# Patient Record
Sex: MALE | Race: WHITE | NOT HISPANIC OR LATINO | Employment: FULL TIME | URBAN - METROPOLITAN AREA
[De-identification: names, ages, dates, MRNs, and addresses within clinical notes are randomized per-mention and may not be internally consistent; named-entity substitution may affect disease eponyms.]

---

## 2022-12-11 ENCOUNTER — HOSPITAL ENCOUNTER (EMERGENCY)
Facility: CLINIC | Age: 27
Discharge: HOME | End: 2022-12-12
Attending: EMERGENCY MEDICINE | Admitting: EMERGENCY MEDICINE
Payer: COMMERCIAL

## 2022-12-11 ENCOUNTER — APPOINTMENT (OUTPATIENT)
Dept: CT IMAGING | Facility: CLINIC | Age: 27
End: 2022-12-11
Attending: EMERGENCY MEDICINE
Payer: COMMERCIAL

## 2022-12-11 DIAGNOSIS — K21.00 GASTROESOPHAGEAL REFLUX DISEASE WITH ESOPHAGITIS WITHOUT HEMORRHAGE: ICD-10-CM

## 2022-12-11 DIAGNOSIS — R07.89 OTHER CHEST PAIN: ICD-10-CM

## 2022-12-11 LAB
ALBUMIN SERPL-MCNC: 3.7 G/DL (ref 3.5–5)
ALP SERPL-CCNC: 70 U/L (ref 45–120)
ALT SERPL W P-5'-P-CCNC: 21 U/L (ref 0–45)
ANION GAP SERPL CALCULATED.3IONS-SCNC: 6 MMOL/L (ref 5–18)
AST SERPL W P-5'-P-CCNC: 17 U/L (ref 0–40)
BASOPHILS # BLD AUTO: 0.1 10E3/UL (ref 0–0.2)
BASOPHILS NFR BLD AUTO: 1 %
BILIRUB SERPL-MCNC: 0.3 MG/DL (ref 0–1)
BUN SERPL-MCNC: 15 MG/DL (ref 8–22)
CALCIUM SERPL-MCNC: 9.6 MG/DL (ref 8.5–10.5)
CHLORIDE BLD-SCNC: 102 MMOL/L (ref 98–107)
CO2 SERPL-SCNC: 29 MMOL/L (ref 22–31)
CREAT SERPL-MCNC: 0.9 MG/DL (ref 0.7–1.3)
D DIMER PPP FEU-MCNC: <=0.27 UG/ML FEU (ref 0–0.5)
EOSINOPHIL # BLD AUTO: 0.4 10E3/UL (ref 0–0.7)
EOSINOPHIL NFR BLD AUTO: 4 %
ERYTHROCYTE [DISTWIDTH] IN BLOOD BY AUTOMATED COUNT: 13.7 % (ref 10–15)
GFR SERPL CREATININE-BSD FRML MDRD: >90 ML/MIN/1.73M2
GLUCOSE BLD-MCNC: 100 MG/DL (ref 70–125)
HCT VFR BLD AUTO: 40 % (ref 40–53)
HGB BLD-MCNC: 13.1 G/DL (ref 13.3–17.7)
HOLD SPECIMEN: NORMAL
IMM GRANULOCYTES # BLD: 0 10E3/UL
IMM GRANULOCYTES NFR BLD: 0 %
LIPASE SERPL-CCNC: 31 U/L (ref 0–52)
LYMPHOCYTES # BLD AUTO: 2.6 10E3/UL (ref 0.8–5.3)
LYMPHOCYTES NFR BLD AUTO: 33 %
MAGNESIUM SERPL-MCNC: 2.3 MG/DL (ref 1.8–2.6)
MCH RBC QN AUTO: 25.7 PG (ref 26.5–33)
MCHC RBC AUTO-ENTMCNC: 32.8 G/DL (ref 31.5–36.5)
MCV RBC AUTO: 79 FL (ref 78–100)
MONOCYTES # BLD AUTO: 0.8 10E3/UL (ref 0–1.3)
MONOCYTES NFR BLD AUTO: 10 %
NEUTROPHILS # BLD AUTO: 4 10E3/UL (ref 1.6–8.3)
NEUTROPHILS NFR BLD AUTO: 52 %
NRBC # BLD AUTO: 0 10E3/UL
NRBC BLD AUTO-RTO: 0 /100
PLATELET # BLD AUTO: 250 10E3/UL (ref 150–450)
POTASSIUM BLD-SCNC: 3.9 MMOL/L (ref 3.5–5)
PROT SERPL-MCNC: 7.8 G/DL (ref 6–8)
RBC # BLD AUTO: 5.09 10E6/UL (ref 4.4–5.9)
SODIUM SERPL-SCNC: 137 MMOL/L (ref 136–145)
TROPONIN I SERPL-MCNC: 0.01 NG/ML (ref 0–0.29)
WBC # BLD AUTO: 7.9 10E3/UL (ref 4–11)

## 2022-12-11 PROCEDURE — 250N000011 HC RX IP 250 OP 636: Performed by: EMERGENCY MEDICINE

## 2022-12-11 PROCEDURE — 96375 TX/PRO/DX INJ NEW DRUG ADDON: CPT | Mod: 59

## 2022-12-11 PROCEDURE — 85025 COMPLETE CBC W/AUTO DIFF WBC: CPT | Performed by: EMERGENCY MEDICINE

## 2022-12-11 PROCEDURE — 83735 ASSAY OF MAGNESIUM: CPT | Performed by: EMERGENCY MEDICINE

## 2022-12-11 PROCEDURE — 83690 ASSAY OF LIPASE: CPT | Performed by: EMERGENCY MEDICINE

## 2022-12-11 PROCEDURE — 36415 COLL VENOUS BLD VENIPUNCTURE: CPT | Performed by: EMERGENCY MEDICINE

## 2022-12-11 PROCEDURE — 85379 FIBRIN DEGRADATION QUANT: CPT | Performed by: EMERGENCY MEDICINE

## 2022-12-11 PROCEDURE — 80053 COMPREHEN METABOLIC PANEL: CPT | Performed by: EMERGENCY MEDICINE

## 2022-12-11 PROCEDURE — 84484 ASSAY OF TROPONIN QUANT: CPT | Performed by: EMERGENCY MEDICINE

## 2022-12-11 PROCEDURE — 93005 ELECTROCARDIOGRAM TRACING: CPT | Performed by: EMERGENCY MEDICINE

## 2022-12-11 PROCEDURE — 96374 THER/PROPH/DIAG INJ IV PUSH: CPT | Mod: 59

## 2022-12-11 PROCEDURE — 258N000003 HC RX IP 258 OP 636: Performed by: EMERGENCY MEDICINE

## 2022-12-11 PROCEDURE — 96361 HYDRATE IV INFUSION ADD-ON: CPT | Mod: 59

## 2022-12-11 PROCEDURE — 74174 CTA ABD&PLVS W/CONTRAST: CPT

## 2022-12-11 PROCEDURE — 99285 EMERGENCY DEPT VISIT HI MDM: CPT | Mod: 25

## 2022-12-11 PROCEDURE — 71275 CT ANGIOGRAPHY CHEST: CPT

## 2022-12-11 RX ORDER — IOPAMIDOL 755 MG/ML
90 INJECTION, SOLUTION INTRAVASCULAR ONCE
Status: COMPLETED | OUTPATIENT
Start: 2022-12-11 | End: 2022-12-11

## 2022-12-11 RX ORDER — MORPHINE SULFATE 4 MG/ML
4 INJECTION, SOLUTION INTRAMUSCULAR; INTRAVENOUS ONCE
Status: COMPLETED | OUTPATIENT
Start: 2022-12-11 | End: 2022-12-11

## 2022-12-11 RX ORDER — LORAZEPAM 2 MG/ML
1 INJECTION INTRAMUSCULAR ONCE
Status: COMPLETED | OUTPATIENT
Start: 2022-12-11 | End: 2022-12-11

## 2022-12-11 RX ORDER — FAMOTIDINE 40 MG/1
40 TABLET, FILM COATED ORAL DAILY
Qty: 14 TABLET | Refills: 0 | Status: SHIPPED | OUTPATIENT
Start: 2022-12-11 | End: 2022-12-25

## 2022-12-11 RX ADMIN — IOPAMIDOL 90 ML: 755 INJECTION, SOLUTION INTRAVENOUS at 23:01

## 2022-12-11 RX ADMIN — SODIUM CHLORIDE 1000 ML: 9 INJECTION, SOLUTION INTRAVENOUS at 21:57

## 2022-12-11 RX ADMIN — LORAZEPAM 1 MG: 2 INJECTION INTRAMUSCULAR; INTRAVENOUS at 22:15

## 2022-12-11 RX ADMIN — FAMOTIDINE 20 MG: 10 INJECTION, SOLUTION INTRAVENOUS at 22:51

## 2022-12-11 RX ADMIN — MORPHINE SULFATE 4 MG: 4 INJECTION, SOLUTION INTRAMUSCULAR; INTRAVENOUS at 22:02

## 2022-12-11 ASSESSMENT — ENCOUNTER SYMPTOMS
COUGH: 0
SHORTNESS OF BREATH: 0
BACK PAIN: 1
FEVER: 0
BLOOD IN STOOL: 0
CHEST TIGHTNESS: 1
CHILLS: 0
ABDOMINAL PAIN: 1

## 2022-12-11 ASSESSMENT — ACTIVITIES OF DAILY LIVING (ADL): ADLS_ACUITY_SCORE: 35

## 2022-12-12 VITALS
TEMPERATURE: 99.1 F | OXYGEN SATURATION: 100 % | DIASTOLIC BLOOD PRESSURE: 60 MMHG | SYSTOLIC BLOOD PRESSURE: 123 MMHG | RESPIRATION RATE: 16 BRPM | HEART RATE: 79 BPM | WEIGHT: 160 LBS

## 2022-12-12 LAB
ATRIAL RATE - MUSE: 70 BPM
ATRIAL RATE - MUSE: 80 BPM
DIASTOLIC BLOOD PRESSURE - MUSE: NORMAL MMHG
DIASTOLIC BLOOD PRESSURE - MUSE: NORMAL MMHG
INTERPRETATION ECG - MUSE: NORMAL
INTERPRETATION ECG - MUSE: NORMAL
P AXIS - MUSE: 72 DEGREES
P AXIS - MUSE: 79 DEGREES
PR INTERVAL - MUSE: 146 MS
PR INTERVAL - MUSE: 146 MS
QRS DURATION - MUSE: 102 MS
QRS DURATION - MUSE: 98 MS
QT - MUSE: 368 MS
QT - MUSE: 396 MS
QTC - MUSE: 424 MS
QTC - MUSE: 427 MS
R AXIS - MUSE: 90 DEGREES
R AXIS - MUSE: 95 DEGREES
SYSTOLIC BLOOD PRESSURE - MUSE: NORMAL MMHG
SYSTOLIC BLOOD PRESSURE - MUSE: NORMAL MMHG
T AXIS - MUSE: 69 DEGREES
T AXIS - MUSE: 79 DEGREES
TROPONIN I SERPL-MCNC: 0.01 NG/ML (ref 0–0.29)
VENTRICULAR RATE- MUSE: 70 BPM
VENTRICULAR RATE- MUSE: 80 BPM

## 2022-12-12 PROCEDURE — 93005 ELECTROCARDIOGRAM TRACING: CPT | Performed by: EMERGENCY MEDICINE

## 2022-12-12 PROCEDURE — 84484 ASSAY OF TROPONIN QUANT: CPT | Performed by: EMERGENCY MEDICINE

## 2022-12-12 PROCEDURE — 84484 ASSAY OF TROPONIN QUANT: CPT | Mod: 91 | Performed by: EMERGENCY MEDICINE

## 2022-12-12 PROCEDURE — 36415 COLL VENOUS BLD VENIPUNCTURE: CPT | Performed by: EMERGENCY MEDICINE

## 2022-12-12 ASSESSMENT — ACTIVITIES OF DAILY LIVING (ADL): ADLS_ACUITY_SCORE: 35

## 2022-12-12 NOTE — ED NOTES
Pt appears very physically uncomfortable, grimacing and clenching his fists; gave 4mg morphine but pt reports minimal relief. He is breathing hard thru clenched teeth; minimally redirectable. Dr. Medrano informed, he will come assess pt. ABCs intact and VSS, including SaO2 and HR.

## 2022-12-12 NOTE — ED NOTES
EMERGENCY DEPARTMENT SIGN OUT NOTE        ED COURSE AND MEDICAL DECISION MAKING  Patient was signed out to me by Dr Nazario Landis at 12:10 AM      In brief, Rajinder Francisco is a 27 year old male who initially presented with chest pain.    Patient reports midsternal chest pain and tightness radiating to the shoulder blades rated as an 8/10 in severity of pain that started 30 minutes prior to arrival. He describes the pain as a heavy weight that is close to the rib cage and has been shifting slightly lower towards the abdomen. Patient notes that he has difficulty with deep breaths secondary to pain.    At time of sign out, disposition was pending repeat troponin and discharge.  Repeat troponin and EKG do not show any evidence of acute pathology including negative troponin and no ischemia or arrhythmia on the EKG.  I did discuss results with the patient, he feels comfortable discharge to home as originally planned by the previous provider.  Return precautions discussed.    FINAL IMPRESSION    1. Gastroesophageal reflux disease with esophagitis without hemorrhage    2. Other chest pain        ED MEDS  Medications   0.9% sodium chloride BOLUS (0 mLs Intravenous Stopped 12/11/22 2243)   morphine (PF) injection 4 mg (4 mg Intravenous Given 12/11/22 2202)   LORazepam (ATIVAN) injection 1 mg (1 mg Intravenous Given 12/11/22 2215)   famotidine (PEPCID) injection 20 mg (20 mg Intravenous Given 12/11/22 2251)   iopamidol (ISOVUE-370) solution 90 mL (90 mLs Intravenous Given 12/11/22 2301)       LAB  Labs Ordered and Resulted from Time of ED Arrival to Time of ED Departure   CBC WITH PLATELETS AND DIFFERENTIAL - Abnormal       Result Value    WBC Count 7.9      RBC Count 5.09      Hemoglobin 13.1 (*)     Hematocrit 40.0      MCV 79      MCH 25.7 (*)     MCHC 32.8      RDW 13.7      Platelet Count 250      % Neutrophils 52      % Lymphocytes 33      % Monocytes 10      % Eosinophils 4      % Basophils 1      % Immature  Granulocytes 0      NRBCs per 100 WBC 0      Absolute Neutrophils 4.0      Absolute Lymphocytes 2.6      Absolute Monocytes 0.8      Absolute Eosinophils 0.4      Absolute Basophils 0.1      Absolute Immature Granulocytes 0.0      Absolute NRBCs 0.0     COMPREHENSIVE METABOLIC PANEL - Normal    Sodium 137      Potassium 3.9      Chloride 102      Carbon Dioxide (CO2) 29      Anion Gap 6      Urea Nitrogen 15      Creatinine 0.90      Calcium 9.6      Glucose 100      Alkaline Phosphatase 70      AST 17      ALT 21      Protein Total 7.8      Albumin 3.7      Bilirubin Total 0.3      GFR Estimate >90     LIPASE - Normal    Lipase 31     TROPONIN I - Normal    Troponin I 0.01     MAGNESIUM - Normal    Magnesium 2.3     D DIMER QUANTITATIVE - Normal    D-Dimer Quantitative <=0.27     TROPONIN I       EKG #2  EKG    Rate: 70bpm  Rhythm: Normal Sinus Rhythm  Axis: right  Interval: Normal  Conduction: Normal  QRS: Normal  ST: Normal  T-wave: Normal  QT: Not prolonged  Comparison EKG: no significant change compared to previous  Impression:  No acute ischemic change   I have independently reviewed and interpreted today's EKG, pending Cardiologist read      RADIOLOGY    CTA Chest Abdomen Pelvis w Contrast   Final Result   IMPRESSION:   1.  No evidence of aortic dissection or aneurysm.   2.  No acute process in the chest, abdomen, or pelvis.             DISCHARGE MEDS  New Prescriptions    FAMOTIDINE (PEPCID) 40 MG TABLET    Take 1 tablet (40 mg) by mouth daily for 14 days       I, Doug Thacker, am serving as a scribe to document services personally performed by Jamie Costa DO, based on my observations and the provider's statements to me.  I, Jamie Costa DO, attest that Doug Thacker is acting in a scribe capacity, has observed my performance of the services and has documented them in accordance with my direction.    Jamie Costa DO  [facility] EMERGENCY ROOM  [address]  [address]  [phone]     Jamie Costa,   12/12/22 0442

## 2022-12-12 NOTE — ED NOTES
Pt's pain visibly improved - physically relaxed and dozing on the stretcher, breathing is more even, jaw and hands are not clenched. Partner at bedside agrees that pt is having significantly less pain. Pt rating as 5/10 with movement. ABCs intact and VS remain stable.

## 2022-12-12 NOTE — DISCHARGE INSTRUCTIONS
Your cardiopulmonary work-up for the chest discomfort was negative.  Given your low risk cardiac status and your negative chest pain work-up this does not appear to be cardiac mediated.  Given your history of Crohn's disease would favor inflammation of your esophagus as the acute source of your symptoms.  Please follow-up this week with your primary care doctor and gastroenterology team for additional assessment take medication as prescribed.  Monitor your symptoms closely.  If escalation to your symptoms develop distal concern return to the emergency department repeat assessment.

## 2022-12-12 NOTE — ED PROVIDER NOTES
EMERGENCY DEPARTMENT ENCOUNTER      NAME: Rajinder Francisco  AGE: 27 year old male  YOB: 1995  MRN: [id number]  EVALUATION DATE & TIME: No admission date for patient encounter.    PCP: No primary care provider on file.    ED PROVIDER: Nazario Landis MD    Chief Complaint   Patient presents with     Chest Pain     FINAL IMPRESSION:  1. Gastroesophageal reflux disease with esophagitis without hemorrhage    2. Other chest pain      ED COURSE & MEDICAL DECISION MAKING:    Pertinent Labs & Imaging studies reviewed. (See chart for details)  27 year old male presents to the Emergency Department for evaluation of chest pain symptoms.  Patient reports onset of chest pain symptoms this evening.  He does have difficulty characterizing the exact nature of his symptoms.  He also reports feeling quite anxious.  Possible history of Crohn's disease managed with Remicade.  On examination which was initially done on triage based on patient's chief complaint the patient was appearing uncomfortable.  He was clutching his anterior chest.  He was appearing also very anxious.  Some mild diffuse abdominal discomfort to palpation nonfocal in nature.  Clinical examination otherwise unremarkable.  Initial ECG did not appear to be ischemic.  Laboratory testing demonstrating a negative troponin and negative D-dimer.  His symptoms were refractory to treatment here in the emergency department.  Could be related to a Crohn's flare although he does not have the other symptoms I would expect such as bloody stools or change to stool at this time.  Ultimately due to his persistent symptoms despite medication management I did recommend CTA imaging of the chest abdomen pelvis for additional assessment.  We will continue to medicate his symptoms awaiting additional imaging testing.  PERC negative heart score negative.    11:59 PM  CTA was negative.  Patient eventually did have significant improvement to his symptomatology and  currently at this point is asymptomatic.  Given the timing of his symptoms I did recommend to the patient that we perform a repeat troponin and electrocardiogram at 3 hours.  If this remains negative along with his negative CTA negative initial work-up his response to treatment here in the emergency department I think patient is appropriate for discharge home.  At this point nothing is suggesting a cardiac mediated process.  Low risk chest pain with no cardiac risk factors.  Would favor esophagitis as acute source for his symptoms with history of chronic GI issues.  We will plan to reassess after repeat testing if negative anticipate discharge home.  Patient signed out to oncoming physician at this point in care.  Patient and family updated and are in agreement with plan of care    9:37 PM I met with the patient for the initial interview and physical examination. Discussed plan for treatment and workup in the ED.           At the conclusion of the encounter I discussed the results of all of the tests and the disposition. The questions were answered. The patient or family acknowledged understanding and was agreeable with the care plan.     Medical Decision Making    History:    Supplemental history from: N/A    External Record(s) reviewed: Documented in HPI, if applicable.    Work Up:    Chart documentation includes differential considered and any EKGs or imaging interpreted by provider.    In additional to work up documented, I considered the following work up: See chart documentation, if applicable.    External consultation:    Discussion of management with another provider: See chart documentation, if applicable    Complicating factors:    Care impacted by chronic illness: None    Care affected by social determinants of health: N/A    Disposition considerations: Admission considered. Patient was signed out to the oncoming physician, disposition pending.    MEDICATIONS GIVEN IN THE EMERGENCY:  Medications   0.9%  sodium chloride BOLUS (0 mLs Intravenous Stopped 12/11/22 2243)   morphine (PF) injection 4 mg (4 mg Intravenous Given 12/11/22 2202)   LORazepam (ATIVAN) injection 1 mg (1 mg Intravenous Given 12/11/22 2215)   famotidine (PEPCID) injection 20 mg (20 mg Intravenous Given 12/11/22 2251)   iopamidol (ISOVUE-370) solution 90 mL (90 mLs Intravenous Given 12/11/22 2301)       NEW PRESCRIPTIONS STARTED AT TODAY'S ER VISIT  New Prescriptions    FAMOTIDINE (PEPCID) 40 MG TABLET    Take 1 tablet (40 mg) by mouth daily for 14 days          =================================================================    HPI    Patient information was obtained from: Patient     Use of : N/A        Rajinder Diego Francisco is a 27 year old male with a pertinent history of Crohn's disease who presents to this ED via private vehicle for evaluation of chest pain.    Patient reports midsternal chest pain and tightness radiating to the shoulder blades rated as an 8/10 in severity of pain that started 30 minutes prior to arrival. He describes the pain as a heavy weight that is close to the rib cage and has been shifting slightly lower towards the abdomen. Patient notes that he has difficulty with deep breaths secondary to pain. He endorses chronic back pain. Patient has Crohn's disease that is controlled by infusions every 8 weeks. He states that the patient had never experienced pain like this before. Patient denies blood in stool, fever, chills, cough, shortness of breath, or additional symptoms or complaints at this time.    REVIEW OF SYSTEMS   Review of Systems   Constitutional: Negative for chills and fever.   Respiratory: Positive for chest tightness. Negative for cough and shortness of breath.    Cardiovascular: Positive for chest pain.   Gastrointestinal: Positive for abdominal pain. Negative for blood in stool.   Musculoskeletal: Positive for back pain.   All other systems reviewed and are negative.       PAST MEDICAL  HISTORY:  Crohn's disease    CURRENT MEDICATIONS:    Remicaide infusion    ALLERGIES:  Not on File    FAMILY HISTORY:  History reviewed. No pertinent family history.    SOCIAL HISTORY:   Social History     Socioeconomic History     Marital status:      Spouse name: None     Number of children: None     Years of education: None     Highest education level: None       VITALS:  /80   Pulse 79   Temp 99.1  F (37.3  C) (Temporal)   Resp 16   Wt 72.6 kg (160 lb)   SpO2 100%     PHYSICAL EXAM    Constitutional: Well developed, Well nourished, anxious appearing adult male, appears to be in acute distress  HENT: Normocephalic, Atraumatic, Bilateral external ears normal, Oropharynx normal, mucous membranes moist, Nose normal. Neck-  Normal range of motion, No tenderness, Supple, No stridor.   Eyes: PERRL, EOMI, Conjunctiva normal, No discharge.   Respiratory: Normal breath sounds, No respiratory distress, No wheezing, Speaks full sentences easily. No cough.   Cardiovascular: Normal heart rate, Regular rhythm, No murmurs Chest wall nontender.    GI: Mild diffuse tenderness to palpation.  : No cva tenderness    Musculoskeletal: 2+ DP pulses. No edema. No cyanosis. Good range of motion in all major joints. No tenderness to palpation. No tenderness of the CTLS spine.   Integument: Warm, Dry, No erythema, No rash. No petechiae.   Neurologic: Alert & oriented x 3, Normal motor function, Normal sensory function, No focal deficits noted.   Psychiatric: Affect normal, Judgment normal, Mood normal. Cooperative.     LAB:  All pertinent labs reviewed and interpreted.  Results for orders placed or performed during the hospital encounter of 12/11/22   CTA Chest Abdomen Pelvis w Contrast    Impression    IMPRESSION:  1.  No evidence of aortic dissection or aneurysm.  2.  No acute process in the chest, abdomen, or pelvis.     Extra Blue Top Tube   Result Value Ref Range    Hold Specimen [facility]    Extra Red Top Tube   Result  Value Ref Range    Hold Specimen [facility]    Extra Green Top (Lithium Heparin) Tube   Result Value Ref Range    Hold Specimen [facility]    Extra Purple Top Tube   Result Value Ref Range    Hold Specimen [facility]    Comprehensive metabolic panel   Result Value Ref Range    Sodium 137 136 - 145 mmol/L    Potassium 3.9 3.5 - 5.0 mmol/L    Chloride 102 98 - 107 mmol/L    Carbon Dioxide (CO2) 29 22 - 31 mmol/L    Anion Gap 6 5 - 18 mmol/L    Urea Nitrogen 15 8 - 22 mg/dL    Creatinine 0.90 0.70 - 1.30 mg/dL    Calcium 9.6 8.5 - 10.5 mg/dL    Glucose 100 70 - 125 mg/dL    Alkaline Phosphatase 70 45 - 120 U/L    AST 17 0 - 40 U/L    ALT 21 0 - 45 U/L    Protein Total 7.8 6.0 - 8.0 g/dL    Albumin 3.7 3.5 - 5.0 g/dL    Bilirubin Total 0.3 0.0 - 1.0 mg/dL    GFR Estimate >90 >60 mL/min/1.73m2   Result Value Ref Range    Lipase 31 0 - 52 U/L   Result Value Ref Range    Troponin I 0.01 0.00 - 0.29 ng/mL   Result Value Ref Range    Magnesium 2.3 1.8 - 2.6 mg/dL   D dimer quantitative   Result Value Ref Range    D-Dimer Quantitative <=0.27 0.00 - 0.50 ug/mL FEU   CBC with platelets and differential   Result Value Ref Range    WBC Count 7.9 4.0 - 11.0 10e3/uL    RBC Count 5.09 4.40 - 5.90 10e6/uL    Hemoglobin 13.1 (L) 13.3 - 17.7 g/dL    Hematocrit 40.0 40.0 - 53.0 %    MCV 79 78 - 100 fL    MCH 25.7 (L) 26.5 - 33.0 pg    MCHC 32.8 31.5 - 36.5 g/dL    RDW 13.7 10.0 - 15.0 %    Platelet Count 250 150 - 450 10e3/uL    % Neutrophils 52 %    % Lymphocytes 33 %    % Monocytes 10 %    % Eosinophils 4 %    % Basophils 1 %    % Immature Granulocytes 0 %    NRBCs per 100 WBC 0 <1 /100    Absolute Neutrophils 4.0 1.6 - 8.3 10e3/uL    Absolute Lymphocytes 2.6 0.8 - 5.3 10e3/uL    Absolute Monocytes 0.8 0.0 - 1.3 10e3/uL    Absolute Eosinophils 0.4 0.0 - 0.7 10e3/uL    Absolute Basophils 0.1 0.0 - 0.2 10e3/uL    Absolute Immature Granulocytes 0.0 <=0.4 10e3/uL    Absolute NRBCs 0.0 10e3/uL       RADIOLOGY:  Reviewed all pertinent imaging. Please see  official radiology report.  CTA Chest Abdomen Pelvis w Contrast   Final Result   IMPRESSION:   1.  No evidence of aortic dissection or aneurysm.   2.  No acute process in the chest, abdomen, or pelvis.           EKG:    Performed at: 21:35    Impression: Sinus rhythm with sinus arrhythmia    Rate: 80  Rhythm: Sinus  DE Interval: 146  QRS Interval: 98  QTc Interval: 424    Comparison: No previous ECGs available    I have independently reviewed and interpreted the EKG(s) documented above.    PERC Criteria    Age ? 50 no   HR  ? 100 no   O2 Sat < 95% on RA no   History of PE/DVT no   Trauma or Surgery within 4 weeks no   Hemoptysis no   Estrogen Therapy no   Unilateral leg swelling no     Heart Score for Chest Pain Patients   History Highly Suspicious 2 0    Moderately Suspicious 1     Slightly Suspicious 0    EKG Significant ST depression 2 0    Nonspecific Repolarization 1     Normal 0    Age >65 years 2 0    45 - 65 years 1     <45 years 0    Risk Factors 3 or more risk factors 2 0    1-2 risk factors 1     No known risk factors 0    Troponin >3x normal 2 0    >1 - <3x normal 1     Normal limit 0    Total 0     *Risk factors for atherosclerotic disease:   Hypercholesterolemia, Hypertension, DM, Cigarette smoking, Family History, Obesity  * Significant ST depression defined as changes of 1mm or greater. T wave inversions and ST depression of 0.5mm considered nonspecific         I, Thang Sinha, am serving as a scribe to document services personally performed by Dr. Landis, based on my observation and the provider's statements to me. I, Nazario Landis MD attest that Thang Sinha is acting in a scribe capacity, has observed my performance of the services and has documented them in accordance with my direction.    Nazario Landis MD  [facility] EMERGENCY ROOM  [address] [address]  [phone]      Nazario Landis MD  12/12/22 0001

## 2022-12-12 NOTE — ED TRIAGE NOTES
Pt presents with midsternal chest pain that radiates to shoulder blades. Pt reports constant pain, 8/10, that started 30 minutes PTA. ABCs intact.      Triage Assessment     Row Name 12/11/22 4272       Triage Assessment (Adult)    Airway WDL WDL       Respiratory WDL    Respiratory WDL X;rhythm/pattern    Rhythm/Pattern, Respiratory shortness of breath       Skin Circulation/Temperature WDL    Skin Circulation/Temperature WDL WDL       Cardiac WDL    Cardiac WDL X;chest pain       Chest Pain Assessment    Chest Pain Location midsternal       Peripheral/Neurovascular WDL    Peripheral Neurovascular WDL WDL       Cognitive/Neuro/Behavioral WDL    Cognitive/Neuro/Behavioral WDL WDL

## 2024-10-12 ENCOUNTER — APPOINTMENT (OUTPATIENT)
Dept: CT IMAGING | Facility: CLINIC | Age: 29
End: 2024-10-12
Attending: STUDENT IN AN ORGANIZED HEALTH CARE EDUCATION/TRAINING PROGRAM
Payer: COMMERCIAL

## 2024-10-12 ENCOUNTER — HOSPITAL ENCOUNTER (INPATIENT)
Facility: CLINIC | Age: 29
LOS: 3 days | Discharge: HOME | End: 2024-10-15
Attending: EMERGENCY MEDICINE | Admitting: EMERGENCY MEDICINE
Payer: COMMERCIAL

## 2024-10-12 DIAGNOSIS — K56.609 SMALL BOWEL OBSTRUCTION (H): ICD-10-CM

## 2024-10-12 DIAGNOSIS — K50.119 CROHN'S DISEASE OF COLON WITH COMPLICATION (H): ICD-10-CM

## 2024-10-12 LAB
ALBUMIN SERPL BCG-MCNC: 4.7 G/DL (ref 3.5–5.2)
ALBUMIN UR-MCNC: 30 MG/DL
ALP SERPL-CCNC: 83 U/L (ref 40–150)
ALT SERPL W P-5'-P-CCNC: 57 U/L (ref 0–70)
AMORPH CRY #/AREA URNS HPF: ABNORMAL /HPF
ANION GAP SERPL CALCULATED.3IONS-SCNC: 14 MMOL/L (ref 7–15)
APPEARANCE UR: ABNORMAL
AST SERPL W P-5'-P-CCNC: 58 U/L (ref 0–45)
BASOPHILS # BLD AUTO: 0.1 10E3/UL (ref 0–0.2)
BASOPHILS NFR BLD AUTO: 0 %
BILIRUB DIRECT SERPL-MCNC: <0.2 MG/DL (ref 0–0.3)
BILIRUB SERPL-MCNC: 1.1 MG/DL
BILIRUB UR QL STRIP: NEGATIVE
BUN SERPL-MCNC: 12.6 MG/DL (ref 6–20)
CALCIUM SERPL-MCNC: 9.8 MG/DL (ref 8.8–10.4)
CHLORIDE SERPL-SCNC: 100 MMOL/L (ref 98–107)
COLOR UR AUTO: YELLOW
CREAT SERPL-MCNC: 0.88 MG/DL (ref 0.67–1.17)
CRP SERPL-MCNC: 3.38 MG/L
CRP SERPL-MCNC: 3.95 MG/L
EGFRCR SERPLBLD CKD-EPI 2021: >90 ML/MIN/1.73M2
EOSINOPHIL # BLD AUTO: 0.1 10E3/UL (ref 0–0.7)
EOSINOPHIL NFR BLD AUTO: 1 %
ERYTHROCYTE [DISTWIDTH] IN BLOOD BY AUTOMATED COUNT: 12.8 % (ref 10–15)
ERYTHROCYTE [SEDIMENTATION RATE] IN BLOOD BY WESTERGREN METHOD: 7 MM/HR (ref 0–15)
GLUCOSE SERPL-MCNC: 129 MG/DL (ref 70–99)
GLUCOSE UR STRIP-MCNC: NEGATIVE MG/DL
HCO3 SERPL-SCNC: 22 MMOL/L (ref 22–29)
HCT VFR BLD AUTO: 45.1 % (ref 40–53)
HGB BLD-MCNC: 15.2 G/DL (ref 13.3–17.7)
HGB UR QL STRIP: NEGATIVE
HOLD SPECIMEN: NORMAL
IMM GRANULOCYTES # BLD: 0.1 10E3/UL
IMM GRANULOCYTES NFR BLD: 0 %
KETONES UR STRIP-MCNC: NEGATIVE MG/DL
LACTATE SERPL-SCNC: 0.7 MMOL/L (ref 0.7–2)
LEUKOCYTE ESTERASE UR QL STRIP: NEGATIVE
LIPASE SERPL-CCNC: 24 U/L (ref 13–60)
LYMPHOCYTES # BLD AUTO: 1 10E3/UL (ref 0.8–5.3)
LYMPHOCYTES NFR BLD AUTO: 8 %
MAGNESIUM SERPL-MCNC: 2.5 MG/DL (ref 1.7–2.3)
MCH RBC QN AUTO: 29.5 PG (ref 26.5–33)
MCHC RBC AUTO-ENTMCNC: 33.7 G/DL (ref 31.5–36.5)
MCV RBC AUTO: 87 FL (ref 78–100)
MONOCYTES # BLD AUTO: 0.6 10E3/UL (ref 0–1.3)
MONOCYTES NFR BLD AUTO: 5 %
MUCOUS THREADS #/AREA URNS LPF: PRESENT /LPF
NEUTROPHILS # BLD AUTO: 10.6 10E3/UL (ref 1.6–8.3)
NEUTROPHILS NFR BLD AUTO: 86 %
NITRATE UR QL: NEGATIVE
NRBC # BLD AUTO: 0 10E3/UL
NRBC BLD AUTO-RTO: 0 /100
PH UR STRIP: 7.5 [PH] (ref 5–7)
PLATELET # BLD AUTO: 247 10E3/UL (ref 150–450)
POTASSIUM SERPL-SCNC: 4.7 MMOL/L (ref 3.4–5.3)
PROT SERPL-MCNC: 8.7 G/DL (ref 6.4–8.3)
RBC # BLD AUTO: 5.16 10E6/UL (ref 4.4–5.9)
RBC URINE: 0 /HPF
SODIUM SERPL-SCNC: 136 MMOL/L (ref 135–145)
SP GR UR STRIP: 1.03 (ref 1–1.03)
UROBILINOGEN UR STRIP-MCNC: 2 MG/DL
WBC # BLD AUTO: 12.3 10E3/UL (ref 4–11)
WBC URINE: 1 /HPF

## 2024-10-12 PROCEDURE — 80048 BASIC METABOLIC PNL TOTAL CA: CPT | Performed by: STUDENT IN AN ORGANIZED HEALTH CARE EDUCATION/TRAINING PROGRAM

## 2024-10-12 PROCEDURE — 96374 THER/PROPH/DIAG INJ IV PUSH: CPT

## 2024-10-12 PROCEDURE — 86140 C-REACTIVE PROTEIN: CPT | Performed by: EMERGENCY MEDICINE

## 2024-10-12 PROCEDURE — 99285 EMERGENCY DEPT VISIT HI MDM: CPT | Mod: 25

## 2024-10-12 PROCEDURE — 250N000011 HC RX IP 250 OP 636: Performed by: EMERGENCY MEDICINE

## 2024-10-12 PROCEDURE — 86140 C-REACTIVE PROTEIN: CPT | Performed by: STUDENT IN AN ORGANIZED HEALTH CARE EDUCATION/TRAINING PROGRAM

## 2024-10-12 PROCEDURE — 74176 CT ABD & PELVIS W/O CONTRAST: CPT

## 2024-10-12 PROCEDURE — 83690 ASSAY OF LIPASE: CPT | Performed by: STUDENT IN AN ORGANIZED HEALTH CARE EDUCATION/TRAINING PROGRAM

## 2024-10-12 PROCEDURE — 250N000011 HC RX IP 250 OP 636: Performed by: STUDENT IN AN ORGANIZED HEALTH CARE EDUCATION/TRAINING PROGRAM

## 2024-10-12 PROCEDURE — 96375 TX/PRO/DX INJ NEW DRUG ADDON: CPT

## 2024-10-12 PROCEDURE — 99222 1ST HOSP IP/OBS MODERATE 55: CPT | Performed by: EMERGENCY MEDICINE

## 2024-10-12 PROCEDURE — 96365 THER/PROPH/DIAG IV INF INIT: CPT

## 2024-10-12 PROCEDURE — 83605 ASSAY OF LACTIC ACID: CPT | Performed by: STUDENT IN AN ORGANIZED HEALTH CARE EDUCATION/TRAINING PROGRAM

## 2024-10-12 PROCEDURE — 258N000003 HC RX IP 258 OP 636: Performed by: STUDENT IN AN ORGANIZED HEALTH CARE EDUCATION/TRAINING PROGRAM

## 2024-10-12 PROCEDURE — 36415 COLL VENOUS BLD VENIPUNCTURE: CPT | Performed by: STUDENT IN AN ORGANIZED HEALTH CARE EDUCATION/TRAINING PROGRAM

## 2024-10-12 PROCEDURE — 85652 RBC SED RATE AUTOMATED: CPT | Performed by: STUDENT IN AN ORGANIZED HEALTH CARE EDUCATION/TRAINING PROGRAM

## 2024-10-12 PROCEDURE — 96366 THER/PROPH/DIAG IV INF ADDON: CPT

## 2024-10-12 PROCEDURE — 120N000001 HC R&B MED SURG/OB

## 2024-10-12 PROCEDURE — 99254 IP/OBS CNSLTJ NEW/EST MOD 60: CPT | Performed by: SURGERY

## 2024-10-12 PROCEDURE — 81001 URINALYSIS AUTO W/SCOPE: CPT | Performed by: STUDENT IN AN ORGANIZED HEALTH CARE EDUCATION/TRAINING PROGRAM

## 2024-10-12 PROCEDURE — 83735 ASSAY OF MAGNESIUM: CPT | Performed by: STUDENT IN AN ORGANIZED HEALTH CARE EDUCATION/TRAINING PROGRAM

## 2024-10-12 PROCEDURE — 82248 BILIRUBIN DIRECT: CPT | Performed by: STUDENT IN AN ORGANIZED HEALTH CARE EDUCATION/TRAINING PROGRAM

## 2024-10-12 PROCEDURE — 85025 COMPLETE CBC W/AUTO DIFF WBC: CPT | Performed by: STUDENT IN AN ORGANIZED HEALTH CARE EDUCATION/TRAINING PROGRAM

## 2024-10-12 PROCEDURE — 258N000003 HC RX IP 258 OP 636: Performed by: EMERGENCY MEDICINE

## 2024-10-12 PROCEDURE — 250N000013 HC RX MED GY IP 250 OP 250 PS 637: Performed by: EMERGENCY MEDICINE

## 2024-10-12 PROCEDURE — 250N000012 HC RX MED GY IP 250 OP 636 PS 637: Performed by: EMERGENCY MEDICINE

## 2024-10-12 PROCEDURE — 36415 COLL VENOUS BLD VENIPUNCTURE: CPT | Performed by: EMERGENCY MEDICINE

## 2024-10-12 RX ORDER — AMOXICILLIN 250 MG
1 CAPSULE ORAL 2 TIMES DAILY PRN
Status: DISCONTINUED | OUTPATIENT
Start: 2024-10-12 | End: 2024-10-15 | Stop reason: HOSPADM

## 2024-10-12 RX ORDER — SODIUM CHLORIDE 9 MG/ML
INJECTION, SOLUTION INTRAVENOUS CONTINUOUS
Status: DISCONTINUED | OUTPATIENT
Start: 2024-10-12 | End: 2024-10-15 | Stop reason: HOSPADM

## 2024-10-12 RX ORDER — BUPROPION HYDROCHLORIDE 100 MG/1
100 TABLET, EXTENDED RELEASE ORAL 2 TIMES DAILY
Status: ON HOLD | COMMUNITY
Start: 2024-07-11 | End: 2024-10-15

## 2024-10-12 RX ORDER — HYDROMORPHONE HYDROCHLORIDE 1 MG/ML
0.5 INJECTION, SOLUTION INTRAMUSCULAR; INTRAVENOUS; SUBCUTANEOUS
Status: DISCONTINUED | OUTPATIENT
Start: 2024-10-12 | End: 2024-10-15 | Stop reason: HOSPADM

## 2024-10-12 RX ORDER — ONDANSETRON 4 MG/1
4 TABLET, ORALLY DISINTEGRATING ORAL EVERY 6 HOURS PRN
Status: DISCONTINUED | OUTPATIENT
Start: 2024-10-12 | End: 2024-10-15 | Stop reason: HOSPADM

## 2024-10-12 RX ORDER — LIDOCAINE 40 MG/G
CREAM TOPICAL
Status: DISCONTINUED | OUTPATIENT
Start: 2024-10-12 | End: 2024-10-15 | Stop reason: HOSPADM

## 2024-10-12 RX ORDER — HYDROMORPHONE HYDROCHLORIDE 1 MG/ML
0.3 INJECTION, SOLUTION INTRAMUSCULAR; INTRAVENOUS; SUBCUTANEOUS
Status: DISCONTINUED | OUTPATIENT
Start: 2024-10-12 | End: 2024-10-15 | Stop reason: HOSPADM

## 2024-10-12 RX ORDER — AMOXICILLIN 250 MG
2 CAPSULE ORAL 2 TIMES DAILY PRN
Status: DISCONTINUED | OUTPATIENT
Start: 2024-10-12 | End: 2024-10-15 | Stop reason: HOSPADM

## 2024-10-12 RX ORDER — AZATHIOPRINE 50 MG/1
150 TABLET ORAL DAILY
Status: DISCONTINUED | OUTPATIENT
Start: 2024-10-12 | End: 2024-10-15 | Stop reason: HOSPADM

## 2024-10-12 RX ORDER — ONDANSETRON 2 MG/ML
4 INJECTION INTRAMUSCULAR; INTRAVENOUS EVERY 6 HOURS PRN
Status: DISCONTINUED | OUTPATIENT
Start: 2024-10-12 | End: 2024-10-15 | Stop reason: HOSPADM

## 2024-10-12 RX ORDER — ONDANSETRON 2 MG/ML
4 INJECTION INTRAMUSCULAR; INTRAVENOUS ONCE
Status: COMPLETED | OUTPATIENT
Start: 2024-10-12 | End: 2024-10-12

## 2024-10-12 RX ORDER — AZATHIOPRINE 50 MG/1
3 TABLET ORAL DAILY
COMMUNITY
Start: 2024-06-24

## 2024-10-12 RX ORDER — METHYLPREDNISOLONE SODIUM SUCCINATE 40 MG/ML
20 INJECTION INTRAMUSCULAR; INTRAVENOUS EVERY 8 HOURS
Status: DISCONTINUED | OUTPATIENT
Start: 2024-10-12 | End: 2024-10-14

## 2024-10-12 RX ORDER — ONDANSETRON 4 MG/1
4 TABLET, FILM COATED ORAL EVERY 8 HOURS PRN
COMMUNITY

## 2024-10-12 RX ORDER — ENOXAPARIN SODIUM 100 MG/ML
40 INJECTION SUBCUTANEOUS EVERY 24 HOURS
Status: DISCONTINUED | OUTPATIENT
Start: 2024-10-12 | End: 2024-10-15 | Stop reason: HOSPADM

## 2024-10-12 RX ORDER — BUPROPION HYDROCHLORIDE 100 MG/1
100 TABLET, EXTENDED RELEASE ORAL 2 TIMES DAILY
Status: DISCONTINUED | OUTPATIENT
Start: 2024-10-12 | End: 2024-10-15 | Stop reason: HOSPADM

## 2024-10-12 RX ORDER — SERTRALINE HYDROCHLORIDE 100 MG/1
1.5 TABLET, FILM COATED ORAL DAILY
Status: ON HOLD | COMMUNITY
Start: 2024-07-11 | End: 2024-10-15

## 2024-10-12 RX ADMIN — ENOXAPARIN SODIUM 40 MG: 100 INJECTION SUBCUTANEOUS at 21:07

## 2024-10-12 RX ADMIN — SODIUM CHLORIDE: 9 INJECTION, SOLUTION INTRAVENOUS at 23:54

## 2024-10-12 RX ADMIN — METHYLPREDNISOLONE SODIUM SUCCINATE 20 MG: 40 INJECTION, POWDER, FOR SOLUTION INTRAMUSCULAR; INTRAVENOUS at 21:07

## 2024-10-12 RX ADMIN — SODIUM CHLORIDE: 9 INJECTION, SOLUTION INTRAVENOUS at 14:10

## 2024-10-12 RX ADMIN — ONDANSETRON 4 MG: 2 INJECTION INTRAMUSCULAR; INTRAVENOUS at 12:39

## 2024-10-12 RX ADMIN — BUPROPION HYDROCHLORIDE 100 MG: 100 TABLET, EXTENDED RELEASE ORAL at 21:05

## 2024-10-12 RX ADMIN — SODIUM CHLORIDE 1000 ML: 9 INJECTION, SOLUTION INTRAVENOUS at 12:35

## 2024-10-12 RX ADMIN — AZATHIOPRINE 150 MG: 50 TABLET ORAL at 21:05

## 2024-10-12 RX ADMIN — METHYLPREDNISOLONE SODIUM SUCCINATE 20 MG: 40 INJECTION, POWDER, FOR SOLUTION INTRAMUSCULAR; INTRAVENOUS at 14:09

## 2024-10-12 RX ADMIN — ONDANSETRON 4 MG: 2 INJECTION INTRAMUSCULAR; INTRAVENOUS at 09:56

## 2024-10-12 RX ADMIN — SERTRALINE 150 MG: 100 TABLET, FILM COATED ORAL at 18:36

## 2024-10-12 ASSESSMENT — ACTIVITIES OF DAILY LIVING (ADL)
ADLS_ACUITY_SCORE: 18
ADLS_ACUITY_SCORE: 35
ADLS_ACUITY_SCORE: 18
ADLS_ACUITY_SCORE: 35
ADLS_ACUITY_SCORE: 18
ADLS_ACUITY_SCORE: 18
ADLS_ACUITY_SCORE: 35
ADLS_ACUITY_SCORE: 18

## 2024-10-12 ASSESSMENT — COLUMBIA-SUICIDE SEVERITY RATING SCALE - C-SSRS
6. HAVE YOU EVER DONE ANYTHING, STARTED TO DO ANYTHING, OR PREPARED TO DO ANYTHING TO END YOUR LIFE?: NO
1. IN THE PAST MONTH, HAVE YOU WISHED YOU WERE DEAD OR WISHED YOU COULD GO TO SLEEP AND NOT WAKE UP?: NO
2. HAVE YOU ACTUALLY HAD ANY THOUGHTS OF KILLING YOURSELF IN THE PAST MONTH?: NO

## 2024-10-12 NOTE — PHARMACY-ADMISSION MEDICATION HISTORY
Pharmacist Admission Medication History    Admission medication history is complete. The information provided in this note is only as accurate as the sources available at the time of the update.    Information Source(s): Patient and CareEverywhere/SureScripts via in-person    Pertinent Information: Patient reports needing refills of sertraline and bupropion - has been out for ~ 1 month. Utilized CareEverywhere for Remicade dosing and last dose that patient receives with [facility].    Changes made to PTA medication list:  Added: all below  Deleted: None  Changed: None    Allergies reviewed with patient and updates made in EHR: yes    Medication History Completed By: Elizabeth Mcdowell, Vinny 10/12/2024 1:00 PM    PTA Med List   Medication Sig Note Last Dose    azaTHIOprine (IMURAN) 50 MG tablet Take 3 tablets by mouth daily.  10/11/2024 at pm    buPROPion (WELLBUTRIN SR) 100 MG 12 hr tablet Take 100 mg by mouth 2 times daily. 10/12/2024: Needs refills Past Month    inFLIXimab (REMICADE IV) Inject 10 mg/kg into the vein every 30 days. At [facility]  9/26/2024    ondansetron (ZOFRAN) 4 MG tablet Take 4 mg by mouth every 8 hours as needed for nausea.  Unknown at prn    sertraline (ZOLOFT) 100 MG tablet Take 1.5 tablets by mouth daily. 10/12/2024: Needs refills   Past Month

## 2024-10-12 NOTE — ED NOTES
Pt with Hx of chron's, states has had upper abdominal pain for past 12 hours with vomiting, feels like chron's flare up. Pt denies diarrhea, denies bloody emesis or stools, denies urinary Sx.

## 2024-10-12 NOTE — CONSULTS
HPI  29 year old year old male who I have been consulted by No ref. provider found for evaluation of Emesis  29-year-old male with longstanding history of Crohn's disease who has been on Remicade infusions as well as azathioprine presents with abdominal pain and distention, nausea and vomiting.  He states this has been occurring over the past 12 hours and has not had any similar breakthrough symptoms in the past.  He has been relatively well-controlled in terms of his Crohn's disease up until recently.  Denies any diarrhea but has had constipation.  Currently he is sitting in bed and feels relatively comfortable apart from mild discomfort in the lower abdominal regions.      Allergies:Patient has no known allergies.    History reviewed. No pertinent past medical history.    History reviewed. No pertinent surgical history.      CURRENT MEDS:    Current Facility-Administered Medications:     azaTHIOprine (IMURAN) tablet 150 mg, 150 mg, Oral, Daily, Melody Eugene MD    buPROPion (WELLBUTRIN SR) 12 hr tablet 100 mg, 100 mg, Oral, BID, Melody Eugene MD    enoxaparin ANTICOAGULANT (LOVENOX) injection 40 mg, 40 mg, Subcutaneous, Q24H, Melody Eugene MD    HYDROmorphone (PF) (DILAUDID) injection 0.3 mg, 0.3 mg, Intravenous, Q2H PRN, Melody Eugene MD    HYDROmorphone (PF) (DILAUDID) injection 0.5 mg, 0.5 mg, Intravenous, Q2H PRN, Melody Eugene MD    lidocaine (LMX4) cream, , Topical, Q1H PRN, Melody Eugene MD    lidocaine 1 % 0.1-1 mL, 0.1-1 mL, Other, Q1H PRN, Melody Eugene MD    methylPREDNISolone sodium succinate (SOLU-MEDROL) injection 20 mg, 20 mg, Intravenous, Q8H, Melody Eugene MD    ondansetron (ZOFRAN ODT) ODT tab 4 mg, 4 mg, Oral, Q6H PRN **OR** ondansetron (ZOFRAN) injection 4 mg, 4 mg, Intravenous, Q6H PRN, Melody Eugene MD    senna-docusate (SENOKOT-S/PERICOLACE) 8.6-50 MG per tablet 1 tablet, 1 tablet, Oral, BID PRN **OR** senna-docusate (SENOKOT-S/PERICOLACE) 8.6-50  "MG per tablet 2 tablet, 2 tablet, Oral, BID PRN, Melody Eugene MD    sertraline (ZOLOFT) tablet 150 mg, 150 mg, Oral, Daily, Melody Eugene MD    sodium chloride (PF) 0.9% PF flush 3 mL, 3 mL, Intracatheter, Q8H, Melody Eugene MD    sodium chloride (PF) 0.9% PF flush 3 mL, 3 mL, Intracatheter, q1 min prn, Melody Eugene MD    FAMHX-reviewed         Review of Systems:  The 12 point review of systems  is within normal limits except for as mentioned above in the HPI.  General ROS: No complaints or constitutional symptoms  Ophthalmic ROS: No complaints of visual changes  Skin: No complaints or symptoms   Endocrine: No complaints or symptoms  Hematologic/Lymphatic: No symptoms or complaints  Psychiatric: No symptoms or complaints  Respiratory ROS: no cough, shortness of breath, or wheezing  Cardiovascular ROS: no chest pain or dyspnea on exertion  Gastrointestinal ROS: As per HPI  Genito-Urinary ROS: no dysuria, trouble voiding, or hematuria  Musculoskeletal ROS: no joint or muscle pain  Neurological ROS: no TIA or stroke symptoms      EXAM:  /68 (BP Location: Right arm, Patient Position: Semi-Maria's, Cuff Size: Adult Regular)   Pulse 74   Temp 97.9  F (36.6  C) (Oral)   Resp 16   Ht 1.803 m (5' 11\")   Wt 81.6 kg (180 lb)   SpO2 98%   BMI 25.10 kg/m    GENERAL: Well developed male, No acute distress, pleasant and conversant   EYES: Pupils equal, round and reactive, no scleral icterus  ABDOMEN: Mild distention with tender to palpation in the right lower and right mid suprapubic region with no peritoneal findings  SKIN: Pink, warm and dry, no obvious rashes or lesions   NEURO:No focal deficits, ambulatory  MUSCULOSKELETAL:No obvious deformities, no swelling, normal appearing      LABS:  Lab Results   Component Value Date    WBC 12.3 10/12/2024    HGB 15.2 10/12/2024    HCT 45.1 10/12/2024    MCV 87 10/12/2024     10/12/2024     INR/Prothrombin Time  Recent Labs   Lab 10/12/24  0938 "      CO2 22   BUN 12.6     Lab Results   Component Value Date    ALT 57 10/12/2024    AST 58 (H) 10/12/2024    ALKPHOS 83 10/12/2024       IMAGES:   Relevant images were reviewed and discussed with the patient.  Notable findings were: CT images reviewed and demonstrate possible TI involvement and small bowel involvement of Crohn's    Assessment/Plan:   Rajinder Francisco is a 29 year old male with Crohn's flare, partial small bowel obstruction.  His CT scan does look as though there is some areas of inflammation and small bowel narrowing.  There is an area on the CT scan that correlates with his discomfort in the right lower and mid abdominal region which is concerning for a possible fistula in evolution versus significant scarring.  -no role for surgery at this time  -agree with GI consultation for medical management of flare up  -surgery will follow      Sly Hurtado D.O. [facility]  [phone]

## 2024-10-12 NOTE — ED PROVIDER NOTES
"Emergency Department Midlevel Supervisory Note     I had a face to face encounter with this patient seen by the Advanced Practice Provider (MICHEL). I personally made/approved the management plan and take responsibility for the patient management. I personally saw patient and performed a substantive portion of the visit including all aspects of the medical decision making.     ED Course:  12:27 PM Maude Ward PA-C staffed patient with me. I agree with their assessment and plan of management, and I will see the patient.  12:37 PM I met with the patient to introduce myself, gather additional history, perform my initial exam, and discuss the plan.     Brief HPI:     Rajinder Francisco is a 29 year old male who presents for evaluation of  of mid upper abdominal pain, nausea, and vomiting that began around 9 PM last night (12 hours ago).  He states during the day yesterday he felt fine, but in the evening he began vomiting late.  He states sleeping on the bathroom floor last night as he was vomiting so frequently.  He has been unable to keep down any food or fluids.  He endorses some feeling of chills, no fevers.  No urinary symptoms.  He states he is on infliximab for Crohn's and has not had a flare in over 2 years.     I, Demetrius Salgado, am serving as a scribe to document services personally performed by Dr. Lilia Mai, based on my observations and the provider's statements to me. I, Dr. Lilia Mai, attest that Demetrius Salgado is acting in a scribe capacity, has observed my performance of the services and has documented them in accordance with my direction.     Brief Physical Exam: /76   Pulse 60   Temp 97.8  F (36.6  C) (Oral)   Resp 16   Ht 1.803 m (5' 11\")   Wt 81.6 kg (180 lb)   SpO2 100%   BMI 25.10 kg/m    Constitutional:  Alert, in no acute distress  EYES: Conjunctivae clear  HENT:  Atraumatic  Respiratory:  Respirations even, unlabored, in no acute respiratory distress  Cardiovascular:  " Regular rate and rhythm, good peripheral perfusion  GI: Soft, non-distended, non-tender ***  Musculoskeletal:  Moves all 4 extremities equally, grossly symmetrical strength  Integument: Warm & dry. No appreciable rash, erythema.  Neurologic:  Alert & oriented, speech clear and fluent, no focal deficits noted  Psych: Normal mood and affect       MDM:  ***       No diagnosis found.    Consults:  I discussed the patient with *** who recommends ***    Labs and Imaging:  Results for orders placed or performed during the hospital encounter of 10/12/24   CT Abdomen Pelvis w/o Contrast    Impression    IMPRESSION:   1.  Small bowel dilatation with a transition point in the left lower quadrant abdomen compatible with small bowel obstruction.  2.  Irregular mural thickening of the distal ileum with mild upstream luminal dilatation. This could represent an area of stricture and fibrosis. Underlying mass is not excluded.     Extra Blue Top Tube   Result Value Ref Range    Hold Specimen JIC    Extra Red Top Tube   Result Value Ref Range    Hold Specimen JIC    Extra Green Top (Lithium Heparin) Tube   Result Value Ref Range    Hold Specimen     Extra Purple Top Tube   Result Value Ref Range    Hold Specimen     Result Value Ref Range    Magnesium 2.5 (H) 1.7 - 2.3 mg/dL   Basic metabolic panel   Result Value Ref Range    Sodium 136 135 - 145 mmol/L    Potassium 4.7 3.4 - 5.3 mmol/L    Chloride 100 98 - 107 mmol/L    Carbon Dioxide (CO2) 22 22 - 29 mmol/L    Anion Gap 14 7 - 15 mmol/L    Urea Nitrogen 12.6 6.0 - 20.0 mg/dL    Creatinine 0.88 0.67 - 1.17 mg/dL    GFR Estimate >90 >60 mL/min/1.73m2    Calcium 9.8 8.8 - 10.4 mg/dL    Glucose 129 (H) 70 - 99 mg/dL   Result Value Ref Range    Lipase 24 13 - 60 U/L   Hepatic function panel   Result Value Ref Range    Protein Total 8.7 (H) 6.4 - 8.3 g/dL    Albumin 4.7 3.5 - 5.2 g/dL    Bilirubin Total 1.1 <=1.2 mg/dL    Alkaline Phosphatase 83 40 - 150 U/L    AST 58 (H) 0 - 45 U/L     ALT 57 0 - 70 U/L    Bilirubin Direct <0.20 0.00 - 0.30 mg/dL   UA with Microscopic reflex to Culture    Specimen: Urine, Clean Catch   Result Value Ref Range    Color Urine Yellow Colorless, Straw, Light Yellow, Yellow    Appearance Urine Turbid (A) Clear    Glucose Urine Negative Negative mg/dL    Bilirubin Urine Negative Negative    Ketones Urine Negative Negative mg/dL    Specific Gravity Urine 1.029 1.001 - 1.030    Blood Urine Negative Negative    pH Urine 7.5 (H) 5.0 - 7.0    Protein Albumin Urine 30 (A) Negative mg/dL    Urobilinogen Urine 2.0 (A) <2.0 mg/dL    Nitrite Urine Negative Negative    Leukocyte Esterase Urine Negative Negative    Mucus Urine Present (A) None Seen /LPF    Amorphous Crystals Urine Few (A) None Seen /HPF    RBC Urine 0 <=2 /HPF    WBC Urine 1 <=5 /HPF   CBC with platelets and differential   Result Value Ref Range    WBC Count 12.3 (H) 4.0 - 11.0 10e3/uL    RBC Count 5.16 4.40 - 5.90 10e6/uL    Hemoglobin 15.2 13.3 - 17.7 g/dL    Hematocrit 45.1 40.0 - 53.0 %    MCV 87 78 - 100 fL    MCH 29.5 26.5 - 33.0 pg    MCHC 33.7 31.5 - 36.5 g/dL    RDW 12.8 10.0 - 15.0 %    Platelet Count 247 150 - 450 10e3/uL    % Neutrophils 86 %    % Lymphocytes 8 %    % Monocytes 5 %    % Eosinophils 1 %    % Basophils 0 %    % Immature Granulocytes 0 %    NRBCs per 100 WBC 0 <1 /100    Absolute Neutrophils 10.6 (H) 1.6 - 8.3 10e3/uL    Absolute Lymphocytes 1.0 0.8 - 5.3 10e3/uL    Absolute Monocytes 0.6 0.0 - 1.3 10e3/uL    Absolute Eosinophils 0.1 0.0 - 0.7 10e3/uL    Absolute Basophils 0.1 0.0 - 0.2 10e3/uL    Absolute Immature Granulocytes 0.1 <=0.4 10e3/uL    Absolute NRBCs 0.0 10e3/uL       I have reviewed the relevant laboratory studies above.    I independently interpreted the following imaging study(s):   ***    EKG: I reviewed and independently interpreted the patient's EKG, with comments made as listed below. Please see scanned EKG for full report.   ***    Procedures:  I was present for the key  portions of procedures documented in MICHEL/midlevel note, see midlevel note for further details.    Lilia Mai MD  [facility] EMERGENCY ROOM  [facility] [facility] [id number] [phone]

## 2024-10-12 NOTE — ED PROVIDER NOTES
"Patient: Salomon Chowdary \"Bill\"    Procedure Summary       Date: 03/17/24 Room / Location: [facility] OR 09 / Virtual [facility] OR    Anesthesia Start: 0829 Anesthesia Stop: 1013    Procedure: Hip Fracture ORIF w/ Nail Trochanteric (Left: Hip) Diagnosis:       Closed displaced intertrochanteric fracture of left femur, initial encounter (CMS/[facility])      (Closed displaced intertrochanteric fracture of left femur, initial encounter (CMS/[facility]) [S72.142A])    Surgeons: Donna Kessler MD Responsible Provider: Estrella Wolf MD    Anesthesia Type: general ASA Status: 4            Anesthesia Type: general    Vitals Value Taken Time   /83 03/17/24 1013   Temp 36 03/17/24 1013   Pulse 97 03/17/24 1013   Resp 16 03/17/24 1013   SpO2 98 03/17/24 1013       Anesthesia Post Evaluation    Patient location during evaluation: PACU  Patient participation: complete - patient participated  Level of consciousness: awake and alert  Pain management: adequate  Airway patency: patent  Cardiovascular status: acceptable  Respiratory status: acceptable  Hydration status: acceptable  Postoperative Nausea and Vomiting: none        There were no known notable events for this encounter.    " Emergency Department Encounter   NAME: Rajinder Francisco ; AGE: 29 year old male ; YOB: 1995 ; MRN: [id number] ; PCP: No Ref-Primary, Physician   ED PROVIDER: Maude Ward PA-C    Evaluation Date & Time:   10/12/2024  9:25 AM    CHIEF COMPLAINT:  Emesis        Impression and Plan   FINAL IMPRESSION:    ICD-10-CM    1. Small bowel obstruction (H)  K56.609       2. Crohn's disease of colon with complication (H)  K50.119           ED Course and Medical Decision Making  Rajinder is a 29 year old male with PMH of Crohn's, migraine headaches, and MDD presenting to the emergency department for evaluation of mid upper abdominal pain, nausea, and vomiting that began around 9 PM last night (12 hours ago).  He states during the day yesterday he felt fine, but in the evening he began vomiting late.  He states sleeping on the bathroom floor last night as he was vomiting so frequently.  He has been unable to keep down any food or fluids.  He endorses some feeling of chills, no fevers.  No urinary symptoms.  His last bowel movement was yesterday afternoon and was fairly normal, he denies any diarrhea or recent constipation.  Denies any blood in his stool or dark tarry stool.  No hematemesis.  No cough, congestion, sore throat, or headache. He states he is on infliximab for Crohn's and has not had a flare in over 2 years.    Vitals reviewed and unremarkable, she he is afebrile with temp 97.8 F. /83. On exam he is resting comfortably in the bed, no active emesis or retching. He is not pale or diaphoretic. Non toxic appearing. Differential diagnosis/emergent conditions considered and evaluated for includes but not limited to viral gastroenteritis, foodborne infection, Crohn's flare, hepatitis, pancreatitis, appendicitis, SBO.  His nausea improved with IV zofran upon arrival and I was unable to perform abdominal exam. His abdomen is diffusely tender, no areas of focal tenderness. Belly is fairly soft, minimal  distention.  No CVA tenderness bilaterally. CBC finds a leukocytosis of 12.3.  He is otherwise afebrile and not tachycardic.  Does not meet SIRS criteria.  Lactic acid within normal range.  Lipase and hepatic function are within normal range.  BMP does not find any significant electrolyte abnormalities or evidence of JULIANA.  UA without blood or evidence of UTI.     Given his history of Crohn's disease with leukocytosis and intractable vomiting discussed proceeding with CT scan, he is agreeable to this. CT abdomen pelvis finds small bowel dilation up to 4cm  with a transition point in the left lower quadrant.  There is an area of irregular mural thickening of the distal ileum with mild upstream luminal dilation measuring up to 3.1 cm.  No evidence of free air or abscess.    I spoke with Dr. Hurtado of general surgery, plan to admit patient and make him NPO with plan for general surgery to see on the floor.  I went to update patient on CT findings and plan.  He is agreeable to admission, he is stating his nausea is coming back. I ordered additional zofran as well as 1L IV NS.  I spoke with hospitalist and patient was accepted for admission.          Supplemental history:  Obtained supplemental history:Supplemental history obtained?: Documented in chart and Friend  Reviewed external records: External records reviewed?: No  Patient information was obtained from: patient  Use of Intrepreter: N/A     Complicating Factors:  Care impacted by chronic illness:Other: IBD  Care significantly affected by social determinants of health:N/A    Work Up:  Did you consider but not order tests?: Work up considered but not performed and documented in chart, if applicable  Did you interpret images independently?: Independent interpretation of ECG and images noted in documentation, when applicable.    External Consults:  Consultation discussion with other provider: Dr. Bryant Nuñez.          ED COURSE:  9:55 AM I met and introduced myself to  the patient. I gathered initial history and performed my physical exam. We discussed plan for initial workup.   12:20 PM I have staffed the patient with Dr. Mai, ED MD, who has evaluated the patient and agrees with all aspects of today's care.   12:40 PM I spoke with Dr. Hurtado general surgery  12:46 PM I spoke with Dr. Eugene hospitalist, patient was accepted for admission    At the conclusion of the encounter I discussed the results of all the tests and the disposition. The questions were answered. The patient or family acknowledged understanding and was agreeable with the care plan.      MEDICATIONS GIVEN IN THE EMERGENCY DEPARTMENT:  Medications   ondansetron (ZOFRAN) injection 4 mg (4 mg Intravenous $Given 10/12/24 4151)   sodium chloride 0.9% BOLUS 1,000 mL (1,000 mLs Intravenous $New Bag 10/12/24 3335)   ondansetron (ZOFRAN) injection 4 mg (4 mg Intravenous $Given 10/12/24 4656)         NEW PRESCRIPTIONS STARTED AT TODAY'S ED VISIT:  New Prescriptions    No medications on file         ANNA Calvillo is a 29 year old male with PMH of Crohn's, migraine headaches, and MDD presenting to the emergency department for evaluation of mid upper abdominal pain, nausea, and vomiting that began around 9 PM last night (12 hours ago).  He states during the day yesterday he felt fine, but in the evening he began vomiting late.  He states sleeping on the bathroom floor last night as he was vomiting so frequently.  He has been unable to keep down any food or fluids.  He endorses some feeling of chills, no fevers.  No urinary symptoms.  His last bowel movement was yesterday afternoon and was fairly normal, he denies any diarrhea or recent constipation.  Denies any blood in his stool or dark tarry stool.  No hematemesis.  No cough, congestion, sore throat, or headache. He states he is on infliximab for Crohn's and has not had a flare in over 2 years.      Medical History     No past medical history on file.    No past surgical  "history on file.    No family history on file.         No current outpatient medications on file.        Physical Exam     First Vitals:  Patient Vitals for the past 24 hrs:   BP Temp Temp src Pulse Resp SpO2 Height Weight   10/12/24 1200 -- -- -- 60 -- 100 % -- --   10/12/24 1145 -- -- -- 67 -- 98 % -- --   10/12/24 1130 -- -- -- 64 -- 98 % -- --   10/12/24 1100 120/76 -- -- 67 -- 99 % -- --   10/12/24 1045 126/74 -- -- 62 -- 96 % -- --   10/12/24 0945 117/83 -- -- 74 -- 95 % -- --   10/12/24 0930 129/82 -- -- -- -- -- -- --   10/12/24 0923 126/86 97.8  F (36.6  C) Oral 81 16 98 % 1.803 m (5' 11\") 81.6 kg (180 lb)         PHYSICAL EXAM    General Appearance:  Alert, cooperative, no distress, appears stated age. Non toxic appearing.  HENT: Normocephalic without obvious deformity, atraumatic. Mucous membranes moist   Eyes: Conjunctiva clear, Lids normal. No discharge.   Respiratory: No distress. Lungs clear to ausculation bilaterally. No wheezes, rhonchi or stridor  Cardiovascular: Regular rate and rhythm, no murmur. Normal cap refill. No peripheral edema  GI: Abdomen fairly soft and nondistended with diffuse tenderness throughout the abdomen.  No areas of focal tenderness.  : No CVA tenderness  Musculoskeletal: Moving all extremities. No gross deformities  Integument: Warm, dry, no rashes or lesions  Neurologic: Alert and orientated x3. No focal deficits.  Psych: Normal mood and affect        Results     LAB:  All pertinent labs reviewed and interpreted  Labs Ordered and Resulted from Time of ED Arrival to Time of ED Departure   MAGNESIUM - Abnormal       Result Value    Magnesium 2.5 (*)    BASIC METABOLIC PANEL - Abnormal    Sodium 136      Potassium 4.7      Chloride 100      Carbon Dioxide (CO2) 22      Anion Gap 14      Urea Nitrogen 12.6      Creatinine 0.88      GFR Estimate >90      Calcium 9.8      Glucose 129 (*)    HEPATIC FUNCTION PANEL - Abnormal    Protein Total 8.7 (*)     Albumin 4.7      Bilirubin " Total 1.1      Alkaline Phosphatase 83      AST 58 (*)     ALT 57      Bilirubin Direct <0.20     ROUTINE UA WITH MICROSCOPIC REFLEX TO CULTURE - Abnormal    Color Urine Yellow      Appearance Urine Turbid (*)     Glucose Urine Negative      Bilirubin Urine Negative      Ketones Urine Negative      Specific Gravity Urine 1.029      Blood Urine Negative      pH Urine 7.5 (*)     Protein Albumin Urine 30 (*)     Urobilinogen Urine 2.0 (*)     Nitrite Urine Negative      Leukocyte Esterase Urine Negative      Mucus Urine Present (*)     Amorphous Crystals Urine Few (*)     RBC Urine 0      WBC Urine 1     CBC WITH PLATELETS AND DIFFERENTIAL - Abnormal    WBC Count 12.3 (*)     RBC Count 5.16      Hemoglobin 15.2      Hematocrit 45.1      MCV 87      MCH 29.5      MCHC 33.7      RDW 12.8      Platelet Count 247      % Neutrophils 86      % Lymphocytes 8      % Monocytes 5      % Eosinophils 1      % Basophils 0      % Immature Granulocytes 0      NRBCs per 100 WBC 0      Absolute Neutrophils 10.6 (*)     Absolute Lymphocytes 1.0      Absolute Monocytes 0.6      Absolute Eosinophils 0.1      Absolute Basophils 0.1      Absolute Immature Granulocytes 0.1      Absolute NRBCs 0.0     LIPASE - Normal    Lipase 24     LACTIC ACID WHOLE BLOOD - Normal    Lactic Acid 0.7     ERYTHROCYTE SEDIMENTATION RATE AUTO   CRP INFLAMMATION       RADIOLOGY:  CT Abdomen Pelvis w/o Contrast   Final Result   IMPRESSION:    1.  Small bowel dilatation with a transition point in the left lower quadrant abdomen compatible with small bowel obstruction.   2.  Irregular mural thickening of the distal ileum with mild upstream luminal dilatation. This could represent an area of stricture and fibrosis. Underlying mass is not excluded.               ECG:  N/A      PROCEDURES:  N/A      Maude Ward PA-C   Emergency Medicine   [facility] EMERGENCY ROOM       Maude Ward PA-C  10/12/24 0024

## 2024-10-12 NOTE — PLAN OF CARE
Goal Outcome Evaluation:       Pt alert and oriented x4. VSS. Pt NPO. IVFs infusing. Denies nausea. Pt rates abdominal pain 4/10, declines pain med. Voiding adequately. Up independently in room.

## 2024-10-12 NOTE — H&P
[facility] MEDICINE ADMISSION HISTORY AND PHYSICAL     Assessment & Plan        29 year old male into [facility] on 10/12/2024 after presenting to the ER with  Abdominal pain.  PMHx includes Crohns.  (Diagnosed in 2021).  He sees [facility]  (Home); treated with azathioprine daily and remicade infusions.      Diagnostics on arrival showed a white count of 12, normal electrolytes, creatinine.  The lactic acid is normal.  He is hemodynamically stable.  CT abdomen/ pelvis with  Contrast shows irregular mural thickening of the distal ileum with mild upstream luminal dilatation in addition to a SBO with the transition point in the LLQ.      On my interview in the ER he is awake, alert and feeling thirsty.  He feels  Mildly improved since arrival.  Denies passing gas.     Pt will be admitted for treatment of his acute bowel obstruction due to underlying  Crohns.  He has no NG. I will defer to GS regarding pathway; follow up imaging.  Per my discussion with [facility] he will have iv solumedrol.          Problem list:     SBO in patient with underlying Crohns:  iv solumderol, NPO, ivf, GS and  GI consult;   Dvt prevention:  lovenox  Disposition:  Medically Ready for Discharge:  pending resolution of SBO        Chief Complaint  Abdominal pain     HISTORY         Past Medical History     No past medical history on file.     Surgical History   No past surgical history on file.  Family History    Reviewed, and No family history on file.     Social History          Allergies   No Known Allergies  Prior to Admission Medications      None      Review of Systems     A 12 point comprehensive review of systems was negative except as noted above in HPI.    PHYSICAL EXAMINATION       Vitals      Temp:  [97.8  F (36.6  C)] 97.8  F (36.6  C)  Pulse:  [60-81] 60  Resp:  [16] 16  BP: (117-129)/(74-86) 120/76  SpO2:  [95 %-100 %] 100 %    Examination     GENERAL:  Awake, alert, oriented; comfortable   HEAD:   Normocephalic, without obvious abnormality, atraumatic   EYES:  PERRL, conjunctiva/corneas clear, no scleral icterus, EOM's intact   NOSE: Nares normal, septum midline, mucosa normal, no drainage   THROAT: Lips, mucosa, and tongue normal; teeth and gums normal, mouth moist   NECK: Supple, symmetrical, trachea midline   BACK:   Symmetric, no curvature, ROM normal   LUNGS:   Clear to auscultation bilaterally, no rales, rhonchi, or wheezing, symmetric chest rise on inhalation, respirations unlabored   CHEST WALL:  No tenderness or deformity   HEART:  Regular rate and rhythm, S1 and S2 normal, no murmur, rub, or gallop    ABDOMEN:   Soft, nonfocal, mild diffuse tender; no BS   EXTREMITIES: Extremities normal, atraumatic, no cyanosis or edema    SKIN: Dry to touch, no exanthems in the visualized areas   NEURO: Alert, oriented x 4, moves all four extremities freely, non-focal   PSYCH: Cooperative, behavior is appropriate      Pertinent Lab         Pertinent Radiology     Radiology Results:   Recent Results (from the past 24 hour(s))   CT Abdomen Pelvis w/o Contrast    Narrative    EXAM: CT ABDOMEN PELVIS W/O CONTRAST  LOCATION: [facility]  DATE: 10/12/2024    INDICATION: hx of crohns, nausea and vomiting, leukocytosis  COMPARISON: CTA abdomen/pelvis 12/11/2022.  TECHNIQUE: CT scan of the abdomen and pelvis was performed without IV contrast. Multiplanar reformats were obtained. Dose reduction techniques were used.  CONTRAST: None.    FINDINGS:   LOWER CHEST: Normal.    HEPATOBILIARY: Normal.    PANCREAS: Normal.    SPLEEN: Normal.    ADRENAL GLANDS: Normal.    KIDNEYS/BLADDER: Normal.    BOWEL: There is small bowel dilatation measuring up to approximately 4 cm with a transition point in the left lower quadrant abdomen (series 5, image 27). There is mild associated mesenteric edema (series 3, image 85). No pneumatosis. There is an area of   irregular spiculated mural thickening of the distal ileum  (series 3, image 156) with mild upstream luminal dilatation measuring up to 3.1 cm. Submucosal fat deposition within the colon, likely sequela of prior inflammation. Normal appendix. Trace   ascites. No pneumoperitoneum.    LYMPH NODES: Normal.    VASCULATURE: Normal caliber abdominal aorta without significant calcified atherosclerotic plaque.    PELVIC ORGANS: Normal.    MUSCULOSKELETAL: No destructive osseous lesion.      Impression    IMPRESSION:   1.  Small bowel dilatation with a transition point in the left lower quadrant abdomen compatible with small bowel obstruction.  2.  Irregular mural thickening of the distal ileum with mild upstream luminal dilatation. This could represent an area of stricture and fibrosis. Underlying mass is not excluded.       EKG Results  Advance Care Planning        Melody Eugene MD  [facility]   Phone: #[phone]

## 2024-10-12 NOTE — ED TRIAGE NOTES
Patient presents to ED with mid upper abdominal pain and N/V for the past 12 hours.  Pearl Nguyen RN.......10/12/2024 9:24 AM     Triage Assessment (Adult)       Row Name 10/12/24 7739          Triage Assessment    Airway WDL WDL        Respiratory WDL    Respiratory WDL WDL        Skin Circulation/Temperature WDL    Skin Circulation/Temperature WDL WDL        Cardiac WDL    Cardiac WDL WDL        Peripheral/Neurovascular WDL    Peripheral Neurovascular WDL WDL        Cognitive/Neuro/Behavioral WDL    Cognitive/Neuro/Behavioral WDL WDL

## 2024-10-12 NOTE — CONSULTS
[place] [facility] CONSULTATION    Rajinder Francisco   [address] [address]  29 year old male  Admission Date/Time: 10/12/2024  9:25 AM    Primary Care Provider:  No Ref-Primary, Physician    Requesting Physician: Melody Eugene MD      CHIEF COMPLAINT:   abd pain    REASON FOR THE CONSULT:  SBO, Crohn's disease    HPI:     Rajinder Francisco is a 29M with hx of Crohn's disease involving his terminal ileum with multiple short skip lesions in the mid small bowel and also intersphincteric perianal fistula, currently on Remicade 10 mg/kg every 4 weeks and Azathioprine who presented to [facility] with abd pain and found to have SBO.     Pt states he developed abd pain associated with n/v over the past day or so. Symptoms are different than his typical Crohn's symptoms. He does report at baseline well controlled Crohn's disease until recently. Denies any f/c, diarrhea or rectal bleeding. Does endorse to having constipation. On arrival to the ED, vitals were stable, labs showed WBC 12, normal lactate. CT A/P W showed irregular mural thickening of the distal ileum with mild upstream luminal dilatation in addition to a SBO with the transition point in the LLQ c/w known Crohn's disease.       IBD hx:  History of Crohn's disease which was diagnosed in 2021.   He is currently on Remicade 10 mg/kg after receiving his 1st high-dose infusion in response to complaint of abdominal pain on April 12. He had initially been started on Remicade back in May 2021 after he had been found to have changes consistent with Crohn's disease on colonoscopy performed by my colleague, Dr. Kg Matthew, on 04/16/2021, demonstrating inflammatory changes and stenosis to the ileum consistent with Crohn's disease and aphthous ulcers in the distal rectum and anal canal. Histopathology demonstrated active chronic ileitis from the terminal ileum, and normal biopsies throughout the colon and biopsies from the rectum consistent  with mildly active chronic proctitis with focal granuloma findings.  MR enterography on 05/17/2021, which demonstrated severe terminal ileitis with multiple short skip lesions in the mid-small bowel as well as a intersphincteric perianal fistula. It does not sound as if the patient was seen by Colorectal Surgery and the fistula was otherwise asymptomatic.    The patient went on to start Remicade in May 2021 and has had gradual improvement in his symptoms. He called with worsening abdominal pain in April 2022 and in response to this complaint his next infusion was increased to 10 mg/kg.    10/10/22 Infliximab drug level of 1.2, and anti-infliximab antibody of 170     Switched to 10mg/kg every 4 weeks and addition of azathioprine.      REVIEW OF SYSTEMS: 13 point review of systems is negative except that noted above.    PAST MEDICAL HISTORY: History reviewed. No pertinent past medical history.    PAST SURGICAL HISTORY: History reviewed. No pertinent surgical history.    FAMILY HISTORY: No family history on file.    SOCIAL HISTORY:   Social History     Tobacco Use    Smoking status: Not on file    Smokeless tobacco: Not on file   Substance Use Topics    Alcohol use: Not on file        MEDS:  Medications Prior to Admission   Medication Sig Dispense Refill Last Dose    azaTHIOprine (IMURAN) 50 MG tablet Take 3 tablets by mouth daily.   10/11/2024 at pm    buPROPion (WELLBUTRIN SR) 100 MG 12 hr tablet Take 100 mg by mouth 2 times daily.   Past Month    inFLIXimab (REMICADE IV) Inject 10 mg/kg into the vein every 30 days. At [facility]   9/26/2024    ondansetron (ZOFRAN) 4 MG tablet Take 4 mg by mouth every 8 hours as needed for nausea.   Unknown at prn    sertraline (ZOLOFT) 100 MG tablet Take 1.5 tablets by mouth daily.   Past Month       ALLERGIES/SENSITIVITIES: Patient has no known allergies.    MEDICATIONS:  No current outpatient medications on file.       PHYSICAL EXAM:  Temp:  [97.8  F (36.6  C)-97.9  F (36.6  C)] 97.9  F  "(36.6  C)  Pulse:  [60-81] 74  Resp:  [16] 16  BP: (117-139)/(68-86) 128/68  SpO2:  [95 %-100 %] 98 %  Body mass index is 25.1 kg/m .  GEN: Well developed, well nourished 29 year old in no acute distress.  HEENT: sclera anicteric, moist mucous membranes.   LYMPH: No cervical lymphadenopathy  PULM: Nonlabored breathing. Breath sounds equal.   CARDIO: Regular rate  GI: Non-distended. Soft.  Mild TTP.  No guarding. No rebound tenderness.  EXT: warm, no lower extremity edema  NEURO: Alert. No focal defects.   PSYCH: Mental status appropriate, mood and affect normal.    SKIN: No rashes  MSK: No joint abnormalities    LABORATORY DATA:  CBC RESULTS:   Recent Labs   Lab Test 10/12/24  0938   WBC 12.3*   RBC 5.16   HGB 15.2   HCT 45.1   MCV 87   MCH 29.5   MCHC 33.7   RDW 12.8           CMP Results:   Recent Labs   Lab Test 10/12/24  0938      POTASSIUM 4.7   CHLORIDE 100   CO2 22   ANIONGAP 14   *   BUN 12.6   CR 0.88   BILITOTAL 1.1   ALKPHOS 83   ALT 57   AST 58*        INR Results: No results for input(s): \"INR\" in the last 06048 hours.       RELEVANT IMAGING:    EXAM: CT ABDOMEN PELVIS W/O CONTRAST  LOCATION: [facility]  DATE: 10/12/2024     INDICATION: hx of crohns, nausea and vomiting, leukocytosis  COMPARISON: CTA abdomen/pelvis 12/11/2022.  TECHNIQUE: CT scan of the abdomen and pelvis was performed without IV contrast. Multiplanar reformats were obtained. Dose reduction techniques were used.  CONTRAST: None.     FINDINGS:   LOWER CHEST: Normal.     HEPATOBILIARY: Normal.     PANCREAS: Normal.     SPLEEN: Normal.     ADRENAL GLANDS: Normal.     KIDNEYS/BLADDER: Normal.     BOWEL: There is small bowel dilatation measuring up to approximately 4 cm with a transition point in the left lower quadrant abdomen (series 5, image 27). There is mild associated mesenteric edema (series 3, image 85). No pneumatosis. There is an area of   irregular spiculated mural thickening of the " distal ileum (series 3, image 156) with mild upstream luminal dilatation measuring up to 3.1 cm. Submucosal fat deposition within the colon, likely sequela of prior inflammation. Normal appendix. Trace   ascites. No pneumoperitoneum.     LYMPH NODES: Normal.     VASCULATURE: Normal caliber abdominal aorta without significant calcified atherosclerotic plaque.     PELVIC ORGANS: Normal.     MUSCULOSKELETAL: No destructive osseous lesion.                                                                      IMPRESSION:   1.  Small bowel dilatation with a transition point in the left lower quadrant abdomen compatible with small bowel obstruction.  2.  Irregular mural thickening of the distal ileum with mild upstream luminal dilatation. This could represent an area of stricture and fibrosis. Underlying mass is not excluded.        ASSESSMENT:   Rajinder Francisco is a 29M with hx of Crohn's disease involving his terminal ileum with multiple short skip lesions in the mid small bowel and also intersphincteric perianal fistula, currently on Remicade 10 mg/kg every 4 weeks and Azathioprine who presented to [facility] with abd pain and found to have SBO.       PLAN:  - Keep NPO, Ok for clears if patient demands diet    - IV Solumedrol 20mg TID    - Continue maintenance IV fluids until patient can tolerate regular diet    - Continue daily azathioprine 150mg    - DVT Ppx with lovenox    - Will continue to follow.      68 minutes of total time was spent providing patient care, including patient evaluation, reviewing documentation/test results and .                                                Domonique Sandoval M.D.  Thank you for the opportunity to participate in the care of this patient.   Please feel free to call me with any questions or concerns.  Phone number [phone].              CC: [facility], No Ref-Primary, Physician

## 2024-10-13 LAB
ANION GAP SERPL CALCULATED.3IONS-SCNC: 12 MMOL/L (ref 7–15)
BUN SERPL-MCNC: 12.5 MG/DL (ref 6–20)
CALCIUM SERPL-MCNC: 9.1 MG/DL (ref 8.8–10.4)
CHLORIDE SERPL-SCNC: 103 MMOL/L (ref 98–107)
CREAT SERPL-MCNC: 0.8 MG/DL (ref 0.67–1.17)
EGFRCR SERPLBLD CKD-EPI 2021: >90 ML/MIN/1.73M2
ERYTHROCYTE [DISTWIDTH] IN BLOOD BY AUTOMATED COUNT: 12.5 % (ref 10–15)
GLUCOSE SERPL-MCNC: 119 MG/DL (ref 70–99)
HCO3 SERPL-SCNC: 22 MMOL/L (ref 22–29)
HCT VFR BLD AUTO: 38.5 % (ref 40–53)
HGB BLD-MCNC: 12.8 G/DL (ref 13.3–17.7)
MCH RBC QN AUTO: 29.6 PG (ref 26.5–33)
MCHC RBC AUTO-ENTMCNC: 33.2 G/DL (ref 31.5–36.5)
MCV RBC AUTO: 89 FL (ref 78–100)
PLATELET # BLD AUTO: 217 10E3/UL (ref 150–450)
POTASSIUM SERPL-SCNC: 4.6 MMOL/L (ref 3.4–5.3)
RBC # BLD AUTO: 4.32 10E6/UL (ref 4.4–5.9)
SODIUM SERPL-SCNC: 137 MMOL/L (ref 135–145)
WBC # BLD AUTO: 11.1 10E3/UL (ref 4–11)

## 2024-10-13 PROCEDURE — 120N000001 HC R&B MED SURG/OB

## 2024-10-13 PROCEDURE — 36415 COLL VENOUS BLD VENIPUNCTURE: CPT | Performed by: EMERGENCY MEDICINE

## 2024-10-13 PROCEDURE — 250N000011 HC RX IP 250 OP 636: Performed by: EMERGENCY MEDICINE

## 2024-10-13 PROCEDURE — 250N000013 HC RX MED GY IP 250 OP 250 PS 637: Performed by: EMERGENCY MEDICINE

## 2024-10-13 PROCEDURE — 85027 COMPLETE CBC AUTOMATED: CPT | Performed by: EMERGENCY MEDICINE

## 2024-10-13 PROCEDURE — 80048 BASIC METABOLIC PNL TOTAL CA: CPT | Performed by: EMERGENCY MEDICINE

## 2024-10-13 PROCEDURE — 99232 SBSQ HOSP IP/OBS MODERATE 35: CPT | Performed by: EMERGENCY MEDICINE

## 2024-10-13 PROCEDURE — 250N000012 HC RX MED GY IP 250 OP 636 PS 637: Performed by: EMERGENCY MEDICINE

## 2024-10-13 PROCEDURE — 258N000003 HC RX IP 258 OP 636: Performed by: EMERGENCY MEDICINE

## 2024-10-13 RX ORDER — NALOXONE HYDROCHLORIDE 0.4 MG/ML
0.4 INJECTION, SOLUTION INTRAMUSCULAR; INTRAVENOUS; SUBCUTANEOUS
Status: DISCONTINUED | OUTPATIENT
Start: 2024-10-13 | End: 2024-10-15 | Stop reason: HOSPADM

## 2024-10-13 RX ORDER — NALOXONE HYDROCHLORIDE 0.4 MG/ML
0.2 INJECTION, SOLUTION INTRAMUSCULAR; INTRAVENOUS; SUBCUTANEOUS
Status: DISCONTINUED | OUTPATIENT
Start: 2024-10-13 | End: 2024-10-15 | Stop reason: HOSPADM

## 2024-10-13 RX ADMIN — AZATHIOPRINE 150 MG: 50 TABLET ORAL at 21:25

## 2024-10-13 RX ADMIN — METHYLPREDNISOLONE SODIUM SUCCINATE 20 MG: 40 INJECTION, POWDER, FOR SOLUTION INTRAMUSCULAR; INTRAVENOUS at 05:27

## 2024-10-13 RX ADMIN — METHYLPREDNISOLONE SODIUM SUCCINATE 20 MG: 40 INJECTION, POWDER, FOR SOLUTION INTRAMUSCULAR; INTRAVENOUS at 13:06

## 2024-10-13 RX ADMIN — BUPROPION HYDROCHLORIDE 100 MG: 100 TABLET, EXTENDED RELEASE ORAL at 21:26

## 2024-10-13 RX ADMIN — BUPROPION HYDROCHLORIDE 100 MG: 100 TABLET, EXTENDED RELEASE ORAL at 10:16

## 2024-10-13 RX ADMIN — SODIUM CHLORIDE: 9 INJECTION, SOLUTION INTRAVENOUS at 10:16

## 2024-10-13 RX ADMIN — METHYLPREDNISOLONE SODIUM SUCCINATE 20 MG: 40 INJECTION, POWDER, FOR SOLUTION INTRAMUSCULAR; INTRAVENOUS at 21:26

## 2024-10-13 RX ADMIN — ENOXAPARIN SODIUM 40 MG: 100 INJECTION SUBCUTANEOUS at 21:26

## 2024-10-13 RX ADMIN — SERTRALINE 150 MG: 100 TABLET, FILM COATED ORAL at 10:16

## 2024-10-13 ASSESSMENT — ACTIVITIES OF DAILY LIVING (ADL)
ADLS_ACUITY_SCORE: 18

## 2024-10-13 NOTE — PROGRESS NOTES
General surgery chart check:    Patient appears to be clinically improving.  Is passing flatus and has had a bowel movement.  He will continue his management per GI for his Crohn's flare.  General surgery will reevaluate patient upon request.    Jessica Summers DO  General Surgeon  [facility]  Surgery Clinic - [address]  [place] [address]  Office: [phone]  Employed by - [facility]

## 2024-10-13 NOTE — PROGRESS NOTES
"GI Progress Note  Rajinder Francisco  -83     Subjective:   Wife visiting today.  Patient has not had any vomiting overnight or this morning.  He feels that obstruction is getting better.  He passed a small formed bowel movement this morning and is also been passing gas.  Denies abdominal pain.  He would like to advance to clear liquids.  We discussed remaining on IV steroids until seen by GI tomorrow.     Objective:   /56 (BP Location: Right arm)   Pulse 70   Temp 97.8  F (36.6  C) (Oral)   Resp 16   Ht 1.803 m (5' 11\")   Wt 81.6 kg (180 lb)   SpO2 95%   BMI 25.10 kg/m    Body mass index is 25.1 kg/m .   Gen: No acute distress  Cardio: RRR  GI: Non-distended, BS positive, soft, non-tender. No guarding.    Laboratory  Recent Labs   Lab 10/13/24  0510 10/12/24  0938   WBC 11.1* 12.3*   RBC 4.32* 5.16   HGB 12.8* 15.2   HCT 38.5* 45.1   MCV 89 87   MCH 29.6 29.5   MCHC 33.2 33.7   RDW 12.5 12.8    247      Recent Labs   Lab 10/13/24  0510 10/12/24  0938    136   CO2 22 22   BUN 12.5 12.6     Recent Labs   Lab 10/12/24  0938   ALKPHOS 83   AST 58*   ALT 57     No results found for: \"INR\", \"TROPONIN\", \"TROPI\", \"TROPR\", \"TROPN\"    CT Abdomen Pelvis w/o Contrast    Result Date: 10/12/2024  EXAM: CT ABDOMEN PELVIS W/O CONTRAST LOCATION: [facility] DATE: 10/12/2024 INDICATION: hx of crohns, nausea and vomiting, leukocytosis COMPARISON: CTA abdomen/pelvis 12/11/2022. TECHNIQUE: CT scan of the abdomen and pelvis was performed without IV contrast. Multiplanar reformats were obtained. Dose reduction techniques were used. CONTRAST: None. FINDINGS: LOWER CHEST: Normal. HEPATOBILIARY: Normal. PANCREAS: Normal. SPLEEN: Normal. ADRENAL GLANDS: Normal. KIDNEYS/BLADDER: Normal. BOWEL: There is small bowel dilatation measuring up to approximately 4 cm with a transition point in the left lower quadrant abdomen (series 5, image 27). There is mild associated mesenteric edema " (series 3, image 85). No pneumatosis. There is an area of  irregular spiculated mural thickening of the distal ileum (series 3, image 156) with mild upstream luminal dilatation measuring up to 3.1 cm. Submucosal fat deposition within the colon, likely sequela of prior inflammation. Normal appendix. Trace ascites. No pneumoperitoneum. LYMPH NODES: Normal. VASCULATURE: Normal caliber abdominal aorta without significant calcified atherosclerotic plaque. PELVIC ORGANS: Normal. MUSCULOSKELETAL: No destructive osseous lesion.     IMPRESSION: 1.  Small bowel dilatation with a transition point in the left lower quadrant abdomen compatible with small bowel obstruction. 2.  Irregular mural thickening of the distal ileum with mild upstream luminal dilatation. This could represent an area of stricture and fibrosis. Underlying mass is not excluded.       Assessment:   Rajinder Francisco is a 29M with hx of Crohn's disease involving his terminal ileum with multiple short skip lesions in the mid small bowel and also intersphincteric perianal fistula, currently on Remicade 10 mg/kg every 4 weeks and Azathioprine who presented to [facility] with abd pain and found to have SBO.   -Clinically improving.  -Typically keep on IV steroids for 2-3 days in this case.    Patient Active Problem List   Diagnosis    Small bowel obstruction (H)    Crohn's disease of colon with complication (H)      Plan:   1.  Advance diet to clear liquids  2.  Continue IV Solu-Medrol 20 mg 3 times daily  3.  Continue maintenance IV fluids until patient can tolerate regular diet.  4.  Continue azathioprine 150 mg daily  5.  Patient was advised to contact her office tomorrow morning to schedule an outpatient follow-up with Dr. Almeida MNLEÓN.    A total of 25 minutes were spent on today's care.  Thank you.  Carlos Enrique Ghosh PA-C  [facility]  [phone]

## 2024-10-13 NOTE — PLAN OF CARE
"  Problem: Adult Inpatient Plan of Care  Goal: Readiness for Transition of Care  Outcome: Progressing   Goal Outcome Evaluation:                    /58 (BP Location: Right arm)   Pulse 79   Temp 98  F (36.7  C) (Oral)   Resp 16   Ht 1.803 m (5' 11\")   Wt 81.6 kg (180 lb)   SpO2 94%   BMI 25.10 kg/m      Pt Aox4. Up independently. Denies pain/nausea. Reports he has some abdominal discomfort that has been improving. He had a small bowel movement in the evening and reports he is now passing gas.    Estrada eKmp RN      "

## 2024-10-13 NOTE — PROGRESS NOTES
[facility] MEDICINE PROGRESS NOTE      Summary: 29 year old male into [facility] on 10/12/2024 after presenting to the   Hospital for abdominal pain, vomiting.    PMHx includes Crohns (diagnosed in 2021) on maintenance remicaide infusions    Diagnostics on arrival showed mural thickening of the distal ileum (concerning for  Stricture) along with small bowel dilation with a transition point in the LLQ.    He was treated conservatively with bowel rest, ivf, iv steroids.    10/13/2024:  improved overnight; not requiring narcotics; the pain comes  And goes though he tolerates; per discussion with GI will continue iv steroids  And advance to clear liquids        Hospital day number 1    Problem list:     Crohns exacerbation with SBO:   Mood disorder;  zoloft  Dvt prevention: lovenox  Disposition: Medically Ready for Discharge:        Melody Eugene MD  [facility] Medicine  [facility]  Phone: #[phone]  Securely message with the Vocera Web Console (learn more here)  Text page via [employer] Paging/Directory     Interval History/Subjective:  up watching TV with family; he is passing gas      Physical Exam/Objective:  Temp:  [97.8  F (36.6  C)-98  F (36.7  C)] 97.8  F (36.6  C)  Pulse:  [60-79] 70  Resp:  [16] 16  BP: (106-139)/(56-86) 117/56  SpO2:  [94 %-100 %] 95 %  Body mass index is 25.1 kg/m .    GENERAL:  Alert, appears comfortable, in no acute distress, appears stated age   HEAD:  Normocephalic, without obvious abnormality, atraumatic   EYES:  PERRL, conjunctiva/corneas clear, no scleral icterus, EOM's intact   NOSE: Nares normal, septum midline, mucosa normal, no drainage   THROAT: Lips, mucosa, and tongue normal; teeth and gums normal, mouth moist   NECK: Supple, symmetrical, trachea midline   BACK:   Symmetric, no curvature, ROM normal   LUNGS:   Clear to auscultation bilaterally, no rales, rhonchi, or wheezing, symmetric chest rise on inhalation,  respirations unlabored   CHEST WALL:  No tenderness or deformity   HEART:  Regular rate and rhythm, S1 and S2 normal, no murmur, rub, or gallop    ABDOMEN:   Soft, nonfocal; +BS   EXTREMITIES: Extremities normal, atraumatic, no cyanosis or edema    SKIN: Dry to touch, no exanthems in the visualized areas   NEURO: Alert, oriented x3, moves all four extremities freely   PSYCH: Cooperative, behavior is appropriate      Data reviewed today: I personally reviewed all new medications, labs, imaging/diagnostics reports over the past 24 hours. Pertinent findings include:    Imaging:   Recent Results (from the past 24 hour(s))   CT Abdomen Pelvis w/o Contrast    Narrative    EXAM: CT ABDOMEN PELVIS W/O CONTRAST  LOCATION: [facility]  DATE: 10/12/2024    INDICATION: hx of crohns, nausea and vomiting, leukocytosis  COMPARISON: CTA abdomen/pelvis 12/11/2022.  TECHNIQUE: CT scan of the abdomen and pelvis was performed without IV contrast. Multiplanar reformats were obtained. Dose reduction techniques were used.  CONTRAST: None.    FINDINGS:   LOWER CHEST: Normal.    HEPATOBILIARY: Normal.    PANCREAS: Normal.    SPLEEN: Normal.    ADRENAL GLANDS: Normal.    KIDNEYS/BLADDER: Normal.    BOWEL: There is small bowel dilatation measuring up to approximately 4 cm with a transition point in the left lower quadrant abdomen (series 5, image 27). There is mild associated mesenteric edema (series 3, image 85). No pneumatosis. There is an area of   irregular spiculated mural thickening of the distal ileum (series 3, image 156) with mild upstream luminal dilatation measuring up to 3.1 cm. Submucosal fat deposition within the colon, likely sequela of prior inflammation. Normal appendix. Trace   ascites. No pneumoperitoneum.    LYMPH NODES: Normal.    VASCULATURE: Normal caliber abdominal aorta without significant calcified atherosclerotic plaque.    PELVIC ORGANS: Normal.    MUSCULOSKELETAL: No destructive osseous  lesion.      Impression    IMPRESSION:   1.  Small bowel dilatation with a transition point in the left lower quadrant abdomen compatible with small bowel obstruction.  2.  Irregular mural thickening of the distal ileum with mild upstream luminal dilatation. This could represent an area of stricture and fibrosis. Underlying mass is not excluded.         Labs:      Medications:   Personally Reviewed.  Medications   Current Facility-Administered Medications   Medication Dose Route Frequency Provider Last Rate Last Admin    sodium chloride 0.9 % infusion   Intravenous Continuous Melody Eugene  mL/hr at 10/13/24 1016 New Bag at 10/13/24 1016     Current Facility-Administered Medications   Medication Dose Route Frequency Provider Last Rate Last Admin    azaTHIOprine (IMURAN) tablet 150 mg  150 mg Oral Daily Melody Eugene MD   150 mg at 10/12/24 2105    buPROPion (WELLBUTRIN SR) 12 hr tablet 100 mg  100 mg Oral BID Melody Eugene MD   100 mg at 10/13/24 1016    enoxaparin ANTICOAGULANT (LOVENOX) injection 40 mg  40 mg Subcutaneous Q24H Melody Eugene MD   40 mg at 10/12/24 2107    methylPREDNISolone sodium succinate (SOLU-MEDROL) injection 20 mg  20 mg Intravenous Q8H Melody Eugene MD   20 mg at 10/13/24 0527    sertraline (ZOLOFT) tablet 150 mg  150 mg Oral Daily Melody Eugene MD   150 mg at 10/13/24 1016    sodium chloride (PF) 0.9% PF flush 3 mL  3 mL Intracatheter Q8H Melody Eugene MD   3 mL at 10/12/24 1411

## 2024-10-13 NOTE — PLAN OF CARE
Goal Outcome Evaluation:       Pt alert and oriented. Independent in room. VSS. Stella clear liquid diet. Denies abdominal pain or nausea. Voiding adequately. Pt reports having x2 bowel movements today and passing more gas.

## 2024-10-13 NOTE — PROGRESS NOTES
"Patient is A & O. Independent. VSS, on RA. Pt reports \"discomfort\" not pain in abd and declining any PRN medications. Voiding without difficulty. L PIV running NS @ 100 mL/h. No light or drains. NPO; x meds/ice chips diet. Anticipated discharge 10/14.           "

## 2024-10-14 PROCEDURE — 250N000011 HC RX IP 250 OP 636: Performed by: EMERGENCY MEDICINE

## 2024-10-14 PROCEDURE — 250N000013 HC RX MED GY IP 250 OP 250 PS 637: Performed by: EMERGENCY MEDICINE

## 2024-10-14 PROCEDURE — 120N000001 HC R&B MED SURG/OB

## 2024-10-14 PROCEDURE — 250N000011 HC RX IP 250 OP 636: Performed by: PHYSICIAN ASSISTANT

## 2024-10-14 PROCEDURE — 36415 COLL VENOUS BLD VENIPUNCTURE: CPT | Performed by: PHYSICIAN ASSISTANT

## 2024-10-14 PROCEDURE — 99232 SBSQ HOSP IP/OBS MODERATE 35: CPT | Performed by: EMERGENCY MEDICINE

## 2024-10-14 PROCEDURE — 250N000013 HC RX MED GY IP 250 OP 250 PS 637: Performed by: PHYSICIAN ASSISTANT

## 2024-10-14 PROCEDURE — 83993 ASSAY FOR CALPROTECTIN FECAL: CPT | Performed by: PHYSICIAN ASSISTANT

## 2024-10-14 PROCEDURE — 80230 DRUG ASSAY INFLIXIMAB: CPT | Performed by: PHYSICIAN ASSISTANT

## 2024-10-14 PROCEDURE — 250N000012 HC RX MED GY IP 250 OP 636 PS 637: Performed by: PHYSICIAN ASSISTANT

## 2024-10-14 PROCEDURE — 250N000012 HC RX MED GY IP 250 OP 636 PS 637: Performed by: EMERGENCY MEDICINE

## 2024-10-14 PROCEDURE — 80299 QUANTITATIVE ASSAY DRUG: CPT | Performed by: PHYSICIAN ASSISTANT

## 2024-10-14 RX ORDER — PREDNISONE 20 MG/1
40 TABLET ORAL DAILY
Status: DISCONTINUED | OUTPATIENT
Start: 2024-10-14 | End: 2024-10-15 | Stop reason: HOSPADM

## 2024-10-14 RX ORDER — PREDNISONE 5 MG/1
5 TABLET ORAL DAILY
Status: DISCONTINUED | OUTPATIENT
Start: 2024-12-02 | End: 2024-10-15 | Stop reason: HOSPADM

## 2024-10-14 RX ORDER — PREDNISONE 20 MG/1
20 TABLET ORAL DAILY
Status: DISCONTINUED | OUTPATIENT
Start: 2024-11-11 | End: 2024-10-15 | Stop reason: HOSPADM

## 2024-10-14 RX ORDER — PREDNISONE 10 MG/1
10 TABLET ORAL DAILY
Status: DISCONTINUED | OUTPATIENT
Start: 2024-11-25 | End: 2024-10-15 | Stop reason: HOSPADM

## 2024-10-14 RX ORDER — FAMOTIDINE 20 MG/1
20 TABLET, FILM COATED ORAL 2 TIMES DAILY
Status: DISCONTINUED | OUTPATIENT
Start: 2024-10-14 | End: 2024-10-15 | Stop reason: HOSPADM

## 2024-10-14 RX ORDER — METHYLPREDNISOLONE SODIUM SUCCINATE 40 MG/ML
20 INJECTION INTRAMUSCULAR; INTRAVENOUS EVERY 8 HOURS
Status: COMPLETED | OUTPATIENT
Start: 2024-10-14 | End: 2024-10-14

## 2024-10-14 RX ADMIN — BUPROPION HYDROCHLORIDE 100 MG: 100 TABLET, EXTENDED RELEASE ORAL at 21:01

## 2024-10-14 RX ADMIN — ENOXAPARIN SODIUM 40 MG: 100 INJECTION SUBCUTANEOUS at 21:06

## 2024-10-14 RX ADMIN — SERTRALINE 150 MG: 100 TABLET, FILM COATED ORAL at 09:02

## 2024-10-14 RX ADMIN — FAMOTIDINE 20 MG: 20 TABLET, FILM COATED ORAL at 11:03

## 2024-10-14 RX ADMIN — METHYLPREDNISOLONE SODIUM SUCCINATE 20 MG: 40 INJECTION, POWDER, FOR SOLUTION INTRAMUSCULAR; INTRAVENOUS at 12:42

## 2024-10-14 RX ADMIN — PREDNISONE 40 MG: 20 TABLET ORAL at 11:04

## 2024-10-14 RX ADMIN — METHYLPREDNISOLONE SODIUM SUCCINATE 20 MG: 40 INJECTION, POWDER, FOR SOLUTION INTRAMUSCULAR; INTRAVENOUS at 21:03

## 2024-10-14 RX ADMIN — METHYLPREDNISOLONE SODIUM SUCCINATE 20 MG: 40 INJECTION, POWDER, FOR SOLUTION INTRAMUSCULAR; INTRAVENOUS at 06:39

## 2024-10-14 RX ADMIN — FAMOTIDINE 20 MG: 20 TABLET, FILM COATED ORAL at 21:01

## 2024-10-14 RX ADMIN — BUPROPION HYDROCHLORIDE 100 MG: 100 TABLET, EXTENDED RELEASE ORAL at 09:02

## 2024-10-14 RX ADMIN — AZATHIOPRINE 150 MG: 50 TABLET ORAL at 21:02

## 2024-10-14 ASSESSMENT — ACTIVITIES OF DAILY LIVING (ADL)
ADLS_ACUITY_SCORE: 18

## 2024-10-14 NOTE — PROGRESS NOTES
GI PROGRESS NOTE  10/14/2024  Rajinder Francisco  1995  /-61    Subjective:  He feels well today other than a mild discomfort in his upper abdomen or chest.  He wonders if it's because he hasn't eaten solid food yet.  He vomited multiple times before admission including bilious emesis.      He has continued to have regular Bms.  He's been tolerating clear liquids.  His next Remicade infusion is due in 10 days.     Objective:    Patient Vitals for the past 24 hrs:   BP Temp Temp src Pulse Resp SpO2   10/14/24 0745 120/71 -- -- 64 16 96 %   10/13/24 2324 125/82 97.6  F (36.4  C) Oral 69 16 98 %   10/13/24 1631 113/60 97.8  F (36.6  C) Oral 72 16 95 %   10/13/24 0943 117/56 97.8  F (36.6  C) Oral 70 16 95 %     Body mass index is 25.1 kg/m .  Gen: NAD  Cardio: RRR  GI: Non-distended, BS positive, soft, non-tender    Laboratory  Recent Labs   Lab Test 10/13/24  0510 10/12/24  0938 12/11/22  2147   WBC 11.1* 12.3* 7.9   HGB 12.8* 15.2 13.1*   MCV 89 87 79    247 250     Recent Labs   Lab Test 10/13/24  0510 10/12/24  0938 12/11/22  2147    136 137   POTASSIUM 4.6 4.7 3.9   CHLORIDE 103 100 102   CO2 22 22 29   BUN 12.5 12.6 15   CR 0.80 0.88 0.90   ANIONGAP 12 14 6   YASMANY 9.1 9.8 9.6   * 129* 100     Recent Labs   Lab Test 10/12/24  1113 10/12/24  0938 12/11/22  2147   ALBUMIN  --  4.7 3.7   BILITOTAL  --  1.1 0.3   ALT  --  57 21   AST  --  58* 17   ALKPHOS  --  83 70   PROTEIN 30*  --   --    LIPASE  --  24 31     CRP 3.95 (normal)    CT Abdomen Pelvis w/o Contrast  Result Date: 10/12/2024  EXAM: CT ABDOMEN PELVIS W/O CONTRAST LOCATION: [facility] DATE: 10/12/2024 INDICATION: hx of crohns, nausea and vomiting, leukocytosis COMPARISON: CTA abdomen/pelvis 12/11/2022. TECHNIQUE: CT scan of the abdomen and pelvis was performed without IV contrast. Multiplanar reformats were obtained. Dose reduction techniques were used. CONTRAST: None. FINDINGS: LOWER CHEST:  Normal. HEPATOBILIARY: Normal. PANCREAS: Normal. SPLEEN: Normal. ADRENAL GLANDS: Normal. KIDNEYS/BLADDER: Normal. BOWEL: There is small bowel dilatation measuring up to approximately 4 cm with a transition point in the left lower quadrant abdomen (series 5, image 27). There is mild associated mesenteric edema (series 3, image 85). No pneumatosis. There is an area of  irregular spiculated mural thickening of the distal ileum (series 3, image 156) with mild upstream luminal dilatation measuring up to 3.1 cm. Submucosal fat deposition within the colon, likely sequela of prior inflammation. Normal appendix. Trace ascites. No pneumoperitoneum. LYMPH NODES: Normal. VASCULATURE: Normal caliber abdominal aorta without significant calcified atherosclerotic plaque. PELVIC ORGANS: Normal. MUSCULOSKELETAL: No destructive osseous lesion.     IMPRESSION: 1.  Small bowel dilatation with a transition point in the left lower quadrant abdomen compatible with small bowel obstruction. 2.  Irregular mural thickening of the distal ileum with mild upstream luminal dilatation. This could represent an area of stricture and fibrosis. Underlying mass is not excluded.     Assessment:  Crohn's ileitis, diagnosed in 2021 and on Remicade 10mg/kg every 4 weeks and azathioprine 150 mg daily.  His last GI clinic appointment was 6/2023, and his last Remicade infusion was 9/26.  Admission CT shows SBO with dilation point, possibly related to stenosis.  It's unclear if there is an inflammatory component or not.    Patient Active Problem List   Diagnosis    Small bowel obstruction (H)    Crohn's disease of colon with complication (H)        Plan:    1. Continue steroids but change to oral prednisone with plans for weekly taper (orders entered).  2.  Ok to try full liquid diet this morning, and if he tolerates this he could advance to low fiber diet.  3.  Check fecal calprotectin looking for possible inflammation.  4.  Outpatient GI clinic appointment  should be scheduled asap.  Pt plans to call [facility].  5.  Outpatient Remicade as due 10/24/2024.  6.  Possible discharge home later today or tomorrow if doing well.    29 minutes of total time was spent providing patient care, including patient evaluation, reviewing documentation/test results and .                                                Brii Valentin PA-C  Thank you for the opportunity to participate in the care of this patient.   Please feel free to call me with any questions or concerns.  Phone number [phone].                ADDENDUM:    After reviewing with Dr. Young, we recommend he continue 1 more day of IV steroids.  Ok to advance to low fiber diet.  Will check additional labs including infliximab Ab and drug level as well as thiopurine metabolites.  These results can be reviewed in follow up to help guide therapy.    Brii Valentin PA-C  [facility]  [phone]

## 2024-10-14 NOTE — PLAN OF CARE
"  Problem: Adult Inpatient Plan of Care  Goal: Readiness for Transition of Care  Outcome: Progressing   Goal Outcome Evaluation:                      /82 (BP Location: Right arm)   Pulse 69   Temp 97.6  F (36.4  C) (Oral)   Resp 16   Ht 1.803 m (5' 11\")   Wt 81.6 kg (180 lb)   SpO2 98%   BMI 25.10 kg/m      Pt Aox4. Up independently. Denies pain/discomfort. Had another bowel movement in the evening.    Estrada Kemp RN        "

## 2024-10-14 NOTE — PLAN OF CARE
Problem: Adult Inpatient Plan of Care  Goal: Optimal Comfort and Wellbeing  Outcome: Progressing     Patient A&O, VSS, and CMS intact. Denies pain. Up independently in the room. Tolerating low fiber diet. Stool sample collected. Call light appropriate. Pending medical clearance. Hopeful to discharge tomorrow.  Isabel Vasquez RN

## 2024-10-14 NOTE — PROGRESS NOTES
"Patient is A & O. Independent. VSS, on RA. Pt reports \"discomfort\" not pain in abd and declining any PRN medications. Voiding without difficulty. L PIV saline locked. No light or drains. Clear liquid diet. Anticipated discharge 10/14.           "

## 2024-10-14 NOTE — PROGRESS NOTES
[facility] MEDICINE PROGRESS NOTE      Summary: 29 year old male into [facility] on 10/12/2024 after presenting to the   Hospital for abdominal pain, vomiting.    PMHx includes Crohns (diagnosed in 2021) on maintenance remicaide infusions    Diagnostics on arrival showed mural thickening of the distal ileum (concerning for  Stricture) along with small bowel dilation with a transition point in the LLQ.    He was treated conservatively with bowel rest, ivf, iv steroids.    10/13/2024:  improved overnight; not requiring narcotics; the pain comes  And goes though he tolerates; per discussion with GI will continue iv steroids  And advance to clear liquids  10/14/2024:  afebrile; per discussion with GI will continue with IV steroids today;   Advance diet with plans for discharge tomorrow       Hospital day number 2    Problem list:     Crohns exacerbation with SBO: nonoperative; continue iv solumedrol today;  Azathioprine; prednisone and likely discharge tomorrow   Mood disorder;  zoloft  Dvt prevention: lovenox  Disposition: Medically Ready for Discharge:        Melody Eugene MD  [facility] Medicine  [facility]  Phone: #[phone]  Securely message with the Vocera Web Console (learn more here)  Text page via [employer] Paging/Directory     Interval History/Subjective:  stable overnight;       Physical Exam/Objective:  Temp:  [97.6  F (36.4  C)-97.8  F (36.6  C)] 97.6  F (36.4  C)  Pulse:  [64-72] 64  Resp:  [16] 16  BP: (113-125)/(60-82) 120/71  SpO2:  [95 %-98 %] 96 %  Body mass index is 25.1 kg/m .    GENERAL:  Alert, appears comfortable, in no acute distress, appears stated age   HEAD:  Normocephalic, without obvious abnormality, atraumatic   EYES:  PERRL, conjunctiva/corneas clear, no scleral icterus, EOM's intact   NOSE: Nares normal, septum midline, mucosa normal, no drainage   THROAT: Lips, mucosa, and tongue normal; teeth and gums normal, mouth  moist   NECK: Supple, symmetrical, trachea midline   BACK:   Symmetric, no curvature, ROM normal   LUNGS:   Clear to auscultation bilaterally, no rales, rhonchi, or wheezing, symmetric chest rise on inhalation, respirations unlabored   CHEST WALL:  No tenderness or deformity   HEART:  Regular rate and rhythm, S1 and S2 normal, no murmur, rub, or gallop    ABDOMEN:   Soft, nonfocal; +BS   EXTREMITIES: Extremities normal, atraumatic, no cyanosis or edema    SKIN: Dry to touch, no exanthems in the visualized areas   NEURO: Alert, oriented x3, moves all four extremities freely   PSYCH: Cooperative, behavior is appropriate      Data reviewed today: I personally reviewed all new medications, labs, imaging/diagnostics reports over the past 24 hours. Pertinent findings include:    Imaging:   No results found for this or any previous visit (from the past 24 hour(s)).      Labs:      Medications:   Personally Reviewed.  Medications   Current Facility-Administered Medications   Medication Dose Route Frequency Provider Last Rate Last Admin    sodium chloride 0.9 % infusion   Intravenous Continuous Melody Eugene MD   Stopped at 10/13/24 1909     Current Facility-Administered Medications   Medication Dose Route Frequency Provider Last Rate Last Admin    azaTHIOprine (IMURAN) tablet 150 mg  150 mg Oral Daily Melody Eugene MD   150 mg at 10/13/24 2125    buPROPion (WELLBUTRIN SR) 12 hr tablet 100 mg  100 mg Oral BID Melody Eugene MD   100 mg at 10/14/24 0902    enoxaparin ANTICOAGULANT (LOVENOX) injection 40 mg  40 mg Subcutaneous Q24H Melody Eugene MD   40 mg at 10/13/24 2126    famotidine (PEPCID) tablet 20 mg  20 mg Oral BID Brii Valentin PA-C   20 mg at 10/14/24 1103    methylPREDNISolone sodium succinate (SOLU-MEDROL) injection 20 mg  20 mg Intravenous Q8H Brii Valentin PA-C   20 mg at 10/14/24 1242    predniSONE (DELTASONE) tablet 40 mg  40 mg Oral Daily Brii Valentin PA-C    40 mg at 10/14/24 1104    Followed by    [START ON 10/21/2024] predniSONE (DELTASONE) tablet 35 mg  35 mg Oral Daily Brii Valentin PA-C        Followed by    [START ON 10/28/2024] predniSONE (DELTASONE) tablet 30 mg  30 mg Oral Daily Brii Valentin PA-C        Followed by    [START ON 11/4/2024] predniSONE (DELTASONE) tablet 25 mg  25 mg Oral Daily Brii Valentin PA-C        Followed by    [START ON 11/11/2024] predniSONE (DELTASONE) tablet 20 mg  20 mg Oral Daily Brii Valentin PA-C        Followed by    [START ON 11/18/2024] predniSONE (DELTASONE) tablet 15 mg  15 mg Oral Daily Brii Valentin PA-C        Followed by    [START ON 11/25/2024] predniSONE (DELTASONE) tablet 10 mg  10 mg Oral Daily Brii Valentin PA-C        Followed by    [START ON 12/2/2024] predniSONE (DELTASONE) tablet 5 mg  5 mg Oral Daily Brii Valentin PA-C        sertraline (ZOLOFT) tablet 150 mg  150 mg Oral Daily Melody Eugene MD   150 mg at 10/14/24 0902    sodium chloride (PF) 0.9% PF flush 3 mL  3 mL Intracatheter Q8H Melody Eugene MD   3 mL at 10/14/24 1242

## 2024-10-15 VITALS
BODY MASS INDEX: 25.2 KG/M2 | WEIGHT: 180 LBS | DIASTOLIC BLOOD PRESSURE: 69 MMHG | HEART RATE: 70 BPM | SYSTOLIC BLOOD PRESSURE: 121 MMHG | HEIGHT: 71 IN | TEMPERATURE: 97.2 F | OXYGEN SATURATION: 99 % | RESPIRATION RATE: 16 BRPM

## 2024-10-15 PROCEDURE — 250N000013 HC RX MED GY IP 250 OP 250 PS 637: Performed by: PHYSICIAN ASSISTANT

## 2024-10-15 PROCEDURE — 250N000012 HC RX MED GY IP 250 OP 636 PS 637: Performed by: PHYSICIAN ASSISTANT

## 2024-10-15 PROCEDURE — 250N000013 HC RX MED GY IP 250 OP 250 PS 637: Performed by: EMERGENCY MEDICINE

## 2024-10-15 PROCEDURE — 99239 HOSP IP/OBS DSCHRG MGMT >30: CPT | Performed by: INTERNAL MEDICINE

## 2024-10-15 RX ORDER — PREDNISONE 5 MG/1
25 TABLET ORAL DAILY
Qty: 35 TABLET | Refills: 0 | Status: SHIPPED | OUTPATIENT
Start: 2024-11-04 | End: 2024-11-11

## 2024-10-15 RX ORDER — PREDNISONE 20 MG/1
20 TABLET ORAL DAILY
Qty: 7 TABLET | Refills: 0 | Status: SHIPPED | OUTPATIENT
Start: 2024-11-11 | End: 2024-11-18

## 2024-10-15 RX ORDER — PREDNISONE 10 MG/1
30 TABLET ORAL DAILY
Qty: 21 TABLET | Refills: 0 | Status: SHIPPED | OUTPATIENT
Start: 2024-10-28 | End: 2024-11-04

## 2024-10-15 RX ORDER — PREDNISONE 5 MG/1
35 TABLET ORAL DAILY
Qty: 49 TABLET | Refills: 0 | Status: SHIPPED | OUTPATIENT
Start: 2024-10-21 | End: 2024-10-28

## 2024-10-15 RX ORDER — PREDNISONE 5 MG/1
15 TABLET ORAL DAILY
Qty: 21 TABLET | Refills: 0 | Status: SHIPPED | OUTPATIENT
Start: 2024-11-18 | End: 2024-11-25

## 2024-10-15 RX ORDER — BUPROPION HYDROCHLORIDE 100 MG/1
100 TABLET, EXTENDED RELEASE ORAL 2 TIMES DAILY
Qty: 30 TABLET | Refills: 1 | Status: SHIPPED | OUTPATIENT
Start: 2024-10-15

## 2024-10-15 RX ORDER — PREDNISONE 10 MG/1
10 TABLET ORAL DAILY
Qty: 7 TABLET | Refills: 0 | Status: SHIPPED | OUTPATIENT
Start: 2024-11-25 | End: 2024-12-02

## 2024-10-15 RX ORDER — BUPROPION HYDROCHLORIDE 100 MG/1
100 TABLET, EXTENDED RELEASE ORAL 2 TIMES DAILY
Qty: 30 TABLET | Refills: 1 | Status: SHIPPED | OUTPATIENT
Start: 2024-10-15 | End: 2024-10-15

## 2024-10-15 RX ORDER — PREDNISONE 5 MG/1
5 TABLET ORAL DAILY
Qty: 7 TABLET | Refills: 0 | Status: SHIPPED | OUTPATIENT
Start: 2024-12-02 | End: 2024-12-09

## 2024-10-15 RX ORDER — PREDNISONE 20 MG/1
40 TABLET ORAL DAILY
Qty: 10 TABLET | Refills: 0 | Status: SHIPPED | OUTPATIENT
Start: 2024-10-16 | End: 2024-10-21

## 2024-10-15 RX ORDER — SERTRALINE HYDROCHLORIDE 100 MG/1
150 TABLET, FILM COATED ORAL DAILY
Qty: 30 TABLET | Refills: 1 | Status: SHIPPED | OUTPATIENT
Start: 2024-10-15

## 2024-10-15 RX ADMIN — PREDNISONE 40 MG: 20 TABLET ORAL at 08:05

## 2024-10-15 RX ADMIN — BUPROPION HYDROCHLORIDE 100 MG: 100 TABLET, EXTENDED RELEASE ORAL at 08:05

## 2024-10-15 RX ADMIN — FAMOTIDINE 20 MG: 20 TABLET, FILM COATED ORAL at 08:05

## 2024-10-15 RX ADMIN — SERTRALINE 150 MG: 100 TABLET, FILM COATED ORAL at 08:05

## 2024-10-15 ASSESSMENT — ACTIVITIES OF DAILY LIVING (ADL)
ADLS_ACUITY_SCORE: 18

## 2024-10-15 NOTE — PROGRESS NOTES
GI PROGRESS NOTE  10/15/2024  Rajinder Francisco  1995  /-40    Subjective:  He is feeling well and tolerating a low fiber diet.  He's having small formed stools and he denies having abdominal pain.     Objective:    Patient Vitals for the past 24 hrs:   BP Temp Temp src Pulse Resp SpO2   10/15/24 0800 121/69 97.2  F (36.2  C) Oral 70 16 99 %   10/15/24 0041 114/55 97.5  F (36.4  C) Oral 70 14 95 %   10/14/24 1703 127/56 97.3  F (36.3  C) Axillary 75 16 --     Body mass index is 25.1 kg/m .  Gen: NAD  GI: Non-distended, BS positive, soft, non-tender    Laboratory  Recent Labs   Lab Test 10/13/24  0510 10/12/24  0938 12/11/22  2147   WBC 11.1* 12.3* 7.9   HGB 12.8* 15.2 13.1*   MCV 89 87 79    247 250     Recent Labs   Lab Test 10/13/24  0510 10/12/24  0938 12/11/22  2147    136 137   POTASSIUM 4.6 4.7 3.9   CHLORIDE 103 100 102   CO2 22 22 29   BUN 12.5 12.6 15   CR 0.80 0.88 0.90   ANIONGAP 12 14 6   YASMANY 9.1 9.8 9.6   * 129* 100     Recent Labs   Lab Test 10/12/24  1113 10/12/24  0938 12/11/22  2147   ALBUMIN  --  4.7 3.7   BILITOTAL  --  1.1 0.3   ALT  --  57 21   AST  --  58* 17   ALKPHOS  --  83 70   PROTEIN 30*  --   --    LIPASE  --  24 31     CRP 3.95 (normal)  Pending: fecal calprotectin, infliximab level and ab (10/14/2024- NOT a trough), thiopurine metabolites     CT Abdomen Pelvis w/o Contrast  Result Date: 10/12/2024  EXAM: CT ABDOMEN PELVIS W/O CONTRAST LOCATION: [facility] DATE: 10/12/2024 INDICATION: hx of crohns, nausea and vomiting, leukocytosis COMPARISON: CTA abdomen/pelvis 12/11/2022. TECHNIQUE: CT scan of the abdomen and pelvis was performed without IV contrast. Multiplanar reformats were obtained. Dose reduction techniques were used. CONTRAST: None. FINDINGS: LOWER CHEST: Normal. HEPATOBILIARY: Normal. PANCREAS: Normal. SPLEEN: Normal. ADRENAL GLANDS: Normal. KIDNEYS/BLADDER: Normal. BOWEL: There is small bowel dilatation measuring  up to approximately 4 cm with a transition point in the left lower quadrant abdomen (series 5, image 27). There is mild associated mesenteric edema (series 3, image 85). No pneumatosis. There is an area of  irregular spiculated mural thickening of the distal ileum (series 3, image 156) with mild upstream luminal dilatation measuring up to 3.1 cm. Submucosal fat deposition within the colon, likely sequela of prior inflammation. Normal appendix. Trace ascites. No pneumoperitoneum. LYMPH NODES: Normal. VASCULATURE: Normal caliber abdominal aorta without significant calcified atherosclerotic plaque. PELVIC ORGANS: Normal. MUSCULOSKELETAL: No destructive osseous lesion.      IMPRESSION: 1.  Small bowel dilatation with a transition point in the left lower quadrant abdomen compatible with small bowel obstruction. 2.  Irregular mural thickening of the distal ileum with mild upstream luminal dilatation. This could represent an area of stricture and fibrosis. Underlying mass is not excluded.     Assessment:  Crohn's ileitis, diagnosed in 2021 and on Remicade 10mg/kg every 4 weeks and azathioprine 150 mg daily. His last GI clinic appointment was 6/2023, and his last Remicade infusion was 9/26. He claims compliance with medications.  Admission CT shows SBO with dilation point, possibly related to stenosis. It's unclear if there is an inflammatory component or not. Clinically he has improved after getting 3 days of IV steroids.    Patient Active Problem List   Diagnosis    Small bowel obstruction (H)    Crohn's disease of colon with complication (H)        Plan:    1. Ok to change to oral prednisone with taper as outlined. 40 mg daily x 1 week, 35 mg daily x 1 week, and continue to decrease by 5 mg weekly until done.  2. Pending labs including FCP, thiopurine metabolites and infliximab Ab level.  These can be reviewed at outpatient GI clinic follow up.  3.  GI clinic follow up planned 11/4 with Dr. Bhat.  4.  Continue  Remicade (next infusion planned 10/24) and daily azathioprine for now.  Therapy will be discussed further at follow up.  5.  Ok to discharge home today from a GI standpoint.    19 minutes of total time was spent providing patient care, including patient evaluation, reviewing documentation/test results and .                                                Brii Valentin PA-C  Thank you for the opportunity to participate in the care of this patient.   Please feel free to call me with any questions or concerns.  Phone number [phone].

## 2024-10-15 NOTE — PLAN OF CARE
Problem: Adult Inpatient Plan of Care  Goal: Optimal Comfort and Wellbeing  Outcome: Progressing       Pt is alert and oriented X 4. VSS. Denies pain. Independent in room. Tolerating low fiber diet. Possible discharge today, pending medical clearance.

## 2024-10-15 NOTE — PLAN OF CARE
Problem: Adult Inpatient Plan of Care  Goal: Absence of Hospital-Acquired Illness or Injury  Intervention: Prevent Infection  Recent Flowsheet Documentation  Taken 10/15/2024 1130 by Isabel Vasquez RN  Infection Prevention:   hand hygiene promoted   rest/sleep promoted   single patient room provided  Taken 10/15/2024 0800 by Isabel Vasquez, RN  Infection Prevention:   hand hygiene promoted   rest/sleep promoted   single patient room provided     Problem: Adult Inpatient Plan of Care  Goal: Optimal Comfort and Wellbeing  Outcome: Met     Patient ready for discharge. Completed discharge paperwork with patient. Patient and family stated understanding and questions were answered. All belongings were sent with the patient. Discharged safely.  Isabel Vasquez, RN

## 2024-10-16 LAB — CALPROTECTIN STL-MCNT: 296 MG/KG (ref 0–49.9)

## 2024-10-17 LAB
6-TGN ENTSUB RBC: 109 PMOL/8X10(8)RBC (ref 235–450)
6MMP ENTSUB RBC: 686 PMOL/8X10(8)RBC

## 2024-10-18 LAB
INFLIXIMAB AB SERPL IA-MCNC: <3.1 U/ML
INFLIXIMAB SERPL-MCNC: >34 UG/ML

## 2024-10-18 NOTE — DISCHARGE SUMMARY
"[facility]  Hospitalist Discharge Summary      Date of Admission:  10/12/2024  Date of Discharge:  10/15/2024  1:26 PM  Discharging Provider: Felix Ovalle MD  Discharge Service: Hospitalist Service    Discharge Diagnoses       Summary: 29 year old male into [facility] on 10/12/2024 after presenting to the   Hospital for abdominal pain, vomiting.             10/15 :     Medically ready  Discharge today           Problem list:      Crohns exacerbation with SBO: nonoperative; continue iv solumedrol today;  Azathioprine; prednisone     PMHx includes Crohns (diagnosed in 2021) on maintenance remicaide infusions     Diagnostics on arrival showed mural thickening of the distal ileum (concerning for  Stricture) along with small bowel dilation with a transition point in the LLQ.     He was treated conservatively with bowel rest, ivf, iv steroids.     10/13/2024:  improved overnight; not requiring narcotics; the pain comes  And goes though he tolerates; per discussion with GI will continue iv steroids  And advance to clear liquids  10/14/2024:  afebrile; per discussion with GI will continue with IV steroids today;              Advance diet with plans for discharge tomorrow   10/15 :clinically improved, discharge  today to  home on prednisone taper,  outpatient GI  follow  up       Mood disorder;  zoloft  Dvt prevention: lovenox  Disposition: Medically Ready for Discharge:     Clinically Significant Risk Factors     # Overweight: Estimated body mass index is 25.1 kg/m  as calculated from the following:    Height as of this encounter: 1.803 m (5' 11\").    Weight as of this encounter: 81.6 kg (180 lb).       Follow-ups Needed After Discharge   Follow-up Appointments     Follow-up and recommended labs and tests       Follow up with primary care provider, Physician No Ref-Primary, within 7   days for hospital follow- up.  No follow up labs or test are needed.    Follow up with GI        As advised     "    {Additional follow-up instructions/to-do's for PCP    : none    Unresulted Labs Ordered in the Past 30 Days of this Admission       No orders found from 9/12/2024 to 10/13/2024.        These results will be followed up by pcp    Discharge Disposition   Discharged to home  Condition at discharge: Stable        Consultations This Hospital Stay   GASTROENTEROLOGY IP CONSULT  SURGERY GENERAL IP CONSULT    Code Status   Prior    Time Spent on this Encounter   I, Felix Ovalle MD, personally saw the patient today and spent greater than 30 minutes discharging this patient.       Felix Ovalle MD  [address] [address]  Phone: [phone]  Fax: [phone]  ______________________________________________________________________    Physical Exam   Vital Signs:                    Weight: 180 lbs 0 oz         GENERAL: The patient is not in any acute distressed. Awake and alert.  HEENT: Nonicteric sclerae, PERRLA, EOMI. Oropharynx clear. Moist mucous membranes. Conjunctivae appear well perfused.  HEART: Regular rate and rhythm without murmurs.  LUNGS: Clear to auscultation bilaterally. No wheezing or crackles.  ABDOMEN: Soft, positive bowel sounds, nontender.  SKIN: No rash, no excessive bruising, petechiae, or purpura.  EXTREMITIES : no rashes, no swelling in legs.  NEUROLOGIC: conscious and oriented, follows commands, no obvious focal deficits.  ROS: All other systems negative          Primary Care Physician   Physician No Ref-Primary    Discharge Orders      Reason for your hospital stay    Abdominal pain     Follow-up and recommended labs and tests     Follow up with primary care provider, Physician No Ref-Primary, within 7 days for hospital follow- up.  No follow up labs or test are needed.    Follow up with GI        As advised     Activity    Your activity upon discharge: activity as tolerated     Diet    Follow this diet upon discharge:  Current Diet:Orders Placed This Encounter      Low Fiber Diet       Significant Results and Procedures   Most Recent 3 CBC's:  Recent Labs   Lab Test 10/13/24  0510 10/12/24  0938 12/11/22  2147   WBC 11.1* 12.3* 7.9   HGB 12.8* 15.2 13.1*   MCV 89 87 79    247 250     Most Recent 3 BMP's:  Recent Labs   Lab Test 10/13/24  0510 10/12/24  0938 12/11/22 2147    136 137   POTASSIUM 4.6 4.7 3.9   CHLORIDE 103 100 102   CO2 22 22 29   BUN 12.5 12.6 15   CR 0.80 0.88 0.90   ANIONGAP 12 14 6   YASMANY 9.1 9.8 9.6   * 129* 100     Most Recent 2 LFT's:  Recent Labs   Lab Test 10/12/24  0938 12/11/22 2147   AST 58* 17   ALT 57 21   ALKPHOS 83 70   BILITOTAL 1.1 0.3     Most Recent 3 INR's:No lab results found.    Discharge Medications   Discharge Medication List as of 10/15/2024 12:51 PM        START taking these medications    Details   !! predniSONE (DELTASONE) 10 MG tablet Take 3 tablets (30 mg) by mouth daily for 7 days., Disp-21 tablet, R-0, E-Prescribe      !! predniSONE (DELTASONE) 10 MG tablet Take 1 tablet (10 mg) by mouth daily for 7 days., Disp-7 tablet, R-0, E-Prescribe      !! predniSONE (DELTASONE) 20 MG tablet Take 2 tablets (40 mg) by mouth daily for 5 days., Disp-10 tablet, R-0, E-Prescribe      !! predniSONE (DELTASONE) 20 MG tablet Take 1 tablet (20 mg) by mouth daily for 7 days., Disp-7 tablet, R-0, E-Prescribe      !! predniSONE (DELTASONE) 5 MG tablet Take 7 tablets (35 mg) by mouth daily for 7 days., Disp-49 tablet, R-0, E-Prescribe      !! predniSONE (DELTASONE) 5 MG tablet Take 5 tablets (25 mg) by mouth daily for 7 days., Disp-35 tablet, R-0, E-Prescribe      !! predniSONE (DELTASONE) 5 MG tablet Take 3 tablets (15 mg) by mouth daily for 7 days., Disp-21 tablet, R-0, E-Prescribe      !! predniSONE (DELTASONE) 5 MG tablet Take 1 tablet (5 mg) by mouth daily for 7 days., Disp-7 tablet, R-0, E-Prescribe       !! - Potential duplicate medications found. Please discuss with provider.         CONTINUE these medications which have CHANGED    Details   buPROPion (WELLBUTRIN SR) 100 MG 12 hr tablet Take 1 tablet (100 mg) by mouth 2 times daily., Disp-30 tablet, R-1, E-Prescribe      sertraline (ZOLOFT) 100 MG tablet Take 1.5 tablets (150 mg) by mouth daily., Disp-30 tablet, R-1, E-Prescribe           CONTINUE these medications which have NOT CHANGED    Details   azaTHIOprine (IMURAN) 50 MG tablet Take 3 tablets by mouth daily., Historical      inFLIXimab (REMICADE IV) Inject 10 mg/kg into the vein every 30 days. At [facility], Historical      ondansetron (ZOFRAN) 4 MG tablet Take 4 mg by mouth every 8 hours as needed for nausea., Historical           Allergies   No Known Allergies

## 2024-11-02 PROCEDURE — 96374 THER/PROPH/DIAG INJ IV PUSH: CPT

## 2024-11-03 PROCEDURE — 96375 TX/PRO/DX INJ NEW DRUG ADDON: CPT
